# Patient Record
Sex: FEMALE | Race: WHITE | Employment: UNEMPLOYED | URBAN - METROPOLITAN AREA
[De-identification: names, ages, dates, MRNs, and addresses within clinical notes are randomized per-mention and may not be internally consistent; named-entity substitution may affect disease eponyms.]

---

## 2017-04-05 ENCOUNTER — HOSPITAL ENCOUNTER (OUTPATIENT)
Age: 52
Discharge: HOME | End: 2017-04-05
Attending: FAMILY MEDICINE
Payer: COMMERCIAL

## 2017-04-05 VITALS
BODY MASS INDEX: 32.14 KG/M2 | DIASTOLIC BLOOD PRESSURE: 89 MMHG | HEIGHT: 66 IN | WEIGHT: 200 LBS | OXYGEN SATURATION: 100 % | SYSTOLIC BLOOD PRESSURE: 144 MMHG | HEART RATE: 71 BPM | TEMPERATURE: 97 F | RESPIRATION RATE: 12 BRPM

## 2017-04-05 DIAGNOSIS — R35.0 URINARY FREQUENCY: Primary | ICD-10-CM

## 2017-04-05 PROCEDURE — 99214 OFFICE O/P EST MOD 30 MIN: CPT

## 2017-04-05 PROCEDURE — 87086 URINE CULTURE/COLONY COUNT: CPT | Performed by: FAMILY MEDICINE

## 2017-04-05 PROCEDURE — 99213 OFFICE O/P EST LOW 20 MIN: CPT

## 2017-04-05 RX ORDER — NITROFURANTOIN 25; 75 MG/1; MG/1
100 CAPSULE ORAL 2 TIMES DAILY
Qty: 14 CAPSULE | Refills: 0 | Status: SHIPPED | OUTPATIENT
Start: 2017-04-05 | End: 2017-04-12

## 2017-04-05 NOTE — ED PROVIDER NOTES
[facility]    History   Patient presents with:  Urinary Symptoms (urologic)    Stated Complaint: Poss UTI    HPI    50year old Female patient presents with with  urgency and frequency for 1 day.   Denies va Resp 12  Ht 167.6 cm (5' 6\")  Wt 90.719 kg  BMI 32.30 kg/m2  SpO2 100%  LMP 03/22/2017 (Approximate)    GENERAL: well developed, well nourished, well hydrated, appears comfortable  HEENT:  PERRLA, EOMI, MMM  NECK: supple, no adenopathy  LUNGS:  b/l cta wi

## 2017-04-11 ENCOUNTER — TELEPHONE (OUTPATIENT)
Dept: INTERNAL MEDICINE CLINIC | Facility: CLINIC | Age: 52
End: 2017-04-11

## 2017-04-11 NOTE — TELEPHONE ENCOUNTER
Please contact the patient. She can go ahead and start probiotics. It may help. If her urinary symptoms have resolved, I would not go back on the Macrobid at this time. If she is still having any residual symptoms, she should resume the Macrobid.

## 2017-04-11 NOTE — TELEPHONE ENCOUNTER
Actions Requested: should medication be resumed and probiotics started  Situation/Background   Problem: nausea and gas   Onset: 4/9/17   Associated Symptoms: taken AZO before IC and Dx with UTI treated macrobid.   4 days after started nausea with 1 episode

## 2017-04-11 NOTE — TELEPHONE ENCOUNTER
Pt calling states she was in IC for UTI sx, pt states she was given antibiotics that have caused a reactions. Pt c/o abdominal cramping, vomiting, and vomiting. Pt states she has stopped taking med and would like advice on next step.        Current Outpatie

## 2017-05-27 RX ORDER — LEVOTHYROXINE SODIUM 0.05 MG/1
TABLET ORAL
Qty: 30 TABLET | Refills: 1 | Status: SHIPPED | OUTPATIENT
Start: 2017-05-27 | End: 2017-07-17

## 2017-05-27 NOTE — TELEPHONE ENCOUNTER
Pt is currently in [address] called to check status   Pharm stts its okay to send to [facility] on file and they could transfer her RX   Please advise, Pt out of meds

## 2017-05-27 NOTE — TELEPHONE ENCOUNTER
Hypothyroid Medications  Protocol Criteria:  Appointment scheduled in the past 12 months or the next 3 months  TSH resulted in the past 12 months that is normal  Recent Visits       Provider Department Primary Dx    1 year ago MD Cathy Ortiz

## 2017-07-17 ENCOUNTER — OFFICE VISIT (OUTPATIENT)
Dept: INTERNAL MEDICINE CLINIC | Facility: CLINIC | Age: 52
End: 2017-07-17

## 2017-07-17 VITALS
SYSTOLIC BLOOD PRESSURE: 141 MMHG | DIASTOLIC BLOOD PRESSURE: 83 MMHG | HEART RATE: 75 BPM | WEIGHT: 249 LBS | HEIGHT: 66 IN | BODY MASS INDEX: 40.02 KG/M2

## 2017-07-17 DIAGNOSIS — D23.4 BENIGN NEOPLASM OF SCALP: ICD-10-CM

## 2017-07-17 DIAGNOSIS — E04.9 GOITER: Primary | ICD-10-CM

## 2017-07-17 DIAGNOSIS — E78.5 HYPERLIPIDEMIA, UNSPECIFIED HYPERLIPIDEMIA TYPE: ICD-10-CM

## 2017-07-17 PROCEDURE — 99212 OFFICE O/P EST SF 10 MIN: CPT | Performed by: INTERNAL MEDICINE

## 2017-07-17 PROCEDURE — 99214 OFFICE O/P EST MOD 30 MIN: CPT | Performed by: INTERNAL MEDICINE

## 2017-07-17 RX ORDER — LEVOTHYROXINE SODIUM 0.1 MG/1
100 TABLET ORAL
Qty: 30 TABLET | Refills: 5 | Status: SHIPPED | OUTPATIENT
Start: 2017-07-17 | End: 2018-01-24

## 2017-07-17 NOTE — PROGRESS NOTES
HPI:    Patient ID: Gonzalez Hutchinson is a 50year old female. HPI  Patient is here for follow-up on medical issues as listed below. Last seen here in April 2016. No changes made at that time.   Ultrasound done at that point showed persistent multinodular Neurological: Negative for weakness, numbness and headaches. Hematological: Does not bruise/bleed easily. Psychiatric/Behavioral: Negative for depressed mood. The patient is not nervous/anxious.              Current Outpatient Prescriptions:  [place] [facility] hyperlipidemia type  Plan: Work on diet, exercise, weight loss. Recheck later in the year. (D23.4) Benign neoplasm of scalp  Plan: Appears benign.   Consider dermatology evaluation if patient desires or for changes.    (Z68.41) BMI 40.0-44.9, adult ([address]

## 2017-07-25 RX ORDER — LEVOTHYROXINE SODIUM 0.05 MG/1
TABLET ORAL
Qty: 30 TABLET | Refills: 0 | OUTPATIENT
Start: 2017-07-25

## 2017-10-03 ENCOUNTER — HOSPITAL ENCOUNTER (OUTPATIENT)
Age: 52
Discharge: HOME | End: 2017-10-03
Payer: COMMERCIAL

## 2017-10-03 VITALS
SYSTOLIC BLOOD PRESSURE: 150 MMHG | BODY MASS INDEX: 32.14 KG/M2 | OXYGEN SATURATION: 100 % | DIASTOLIC BLOOD PRESSURE: 84 MMHG | TEMPERATURE: 98 F | HEIGHT: 66 IN | WEIGHT: 200 LBS | HEART RATE: 81 BPM | RESPIRATION RATE: 18 BRPM
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
## 2017-10-03 PROCEDURE — 99213 OFFICE O/P EST LOW 20 MIN: CPT

## 2017-10-03 PROCEDURE — 99214 OFFICE O/P EST MOD 30 MIN: CPT

## 2017-10-03 RX ORDER — PREDNISONE 20 MG/1
40 TABLET ORAL DAILY
Qty: 10 TABLET | Refills: 0 | Status: SHIPPED | OUTPATIENT
Start: 2017-10-03 | End: 2017-10-08

## 2017-10-03 RX ORDER — CEPHALEXIN 500 MG/1
500 CAPSULE ORAL 4 TIMES DAILY
Qty: 28 CAPSULE | Refills: 0 | Status: SHIPPED | OUTPATIENT
Start: 2017-10-03 | End: 2017-10-10

## 2017-10-03 NOTE — ED PROVIDER NOTES
Patient presents with:  Bite (integumentary)      HPI:     Norbert Silva is a 50year old female who presents today with a chief complaint of an insect bite to the left medial lower extremity.   The patient states she has a history of infected insect bites extremity. Size of apple. Redness, swelling, warm to touch. No pus drainage. No streaking.  Non-circumferential.   HEENT: atraumatic, normocephalic, ears, nose and throat are clear  EYES: sclera non icteric bilateral  NECK: supple, no adenopathy  LUNGS: james

## 2017-10-03 NOTE — ED INITIAL ASSESSMENT (HPI)
PATIENT ARRIVED AMBULATORY TO ROOM C/O A POSSIBLE INSECT BITE TO THE LEFT LOWER EXTREMITY. +REDNESS. +SWELLING. +ITCHINESS. LITTLE RELIEF WITH HYDROCORTISONE CREAM. NO FEVERS. NO DRAINAGE.

## 2018-01-24 RX ORDER — LEVOTHYROXINE SODIUM 0.1 MG/1
100 TABLET ORAL
Qty: 90 TABLET | Refills: 0 | Status: SHIPPED | OUTPATIENT
Start: 2018-01-24 | End: 2018-03-29

## 2018-01-24 NOTE — TELEPHONE ENCOUNTER
Please advise on refill request.    Hypothyroid Medications  Protocol Criteria:  Appointment scheduled in the past 12 months or the next 3 months  TSH resulted in the past 12 months that is normal  Recent Outpatient Visits            6 months ago Goiter

## 2018-01-25 NOTE — TELEPHONE ENCOUNTER
Pt was called and notified regarding GILMAK's message below, w/understanding. I offered to schedule f/u appt with pt over the phone however she declined at this time stating she needs to check her scheduled first and will call back in a few days to do so.

## 2018-01-25 NOTE — TELEPHONE ENCOUNTER
Refill x 1 only. Call pt. Needs appt soon as well as repeat TSH to make sure the dose is correct. Please order lab.  Diagnosis hypothyroidism

## 2018-03-23 ENCOUNTER — TELEPHONE (OUTPATIENT)
Dept: INTERNAL MEDICINE CLINIC | Facility: CLINIC | Age: 53
End: 2018-03-23

## 2018-03-23 DIAGNOSIS — E03.9 HYPOTHYROIDISM, UNSPECIFIED TYPE: Primary | ICD-10-CM

## 2018-03-23 NOTE — TELEPHONE ENCOUNTER
Pt is at the lab right now requesting labs for thyroid b/c she was advised to get them done before her appt on 3/29. There are no orders in epic. pls advise.  Thank you

## 2018-03-27 ENCOUNTER — APPOINTMENT (OUTPATIENT)
Dept: LAB | Age: 53
End: 2018-03-27
Attending: INTERNAL MEDICINE
Payer: COMMERCIAL

## 2018-03-27 DIAGNOSIS — E03.9 HYPOTHYROIDISM, UNSPECIFIED TYPE: ICD-10-CM

## 2018-03-27 LAB — TSH SERPL-ACNC: 3.33 UIU/ML (ref 0.45–5.33)

## 2018-03-27 PROCEDURE — 36415 COLL VENOUS BLD VENIPUNCTURE: CPT

## 2018-03-27 PROCEDURE — 84443 ASSAY THYROID STIM HORMONE: CPT

## 2018-03-29 ENCOUNTER — OFFICE VISIT (OUTPATIENT)
Dept: INTERNAL MEDICINE CLINIC | Facility: CLINIC | Age: 53
End: 2018-03-29

## 2018-03-29 VITALS
TEMPERATURE: 98 F | WEIGHT: 247.38 LBS | SYSTOLIC BLOOD PRESSURE: 128 MMHG | RESPIRATION RATE: 20 BRPM | HEIGHT: 66 IN | HEART RATE: 68 BPM | BODY MASS INDEX: 39.76 KG/M2 | DIASTOLIC BLOOD PRESSURE: 84 MMHG

## 2018-03-29 DIAGNOSIS — E04.9 GOITER: Primary | ICD-10-CM

## 2018-03-29 PROCEDURE — 99212 OFFICE O/P EST SF 10 MIN: CPT | Performed by: INTERNAL MEDICINE

## 2018-03-29 PROCEDURE — 99214 OFFICE O/P EST MOD 30 MIN: CPT | Performed by: INTERNAL MEDICINE

## 2018-03-29 RX ORDER — LEVOTHYROXINE SODIUM 0.12 MG/1
125 TABLET ORAL
Qty: 30 TABLET | Refills: 5 | Status: SHIPPED | OUTPATIENT
Start: 2018-03-29 | End: 2018-08-13

## 2018-03-29 NOTE — PROGRESS NOTES
HPI:    Patient ID: Lorne Nogueira is a 48year old female. HPI  Patient is here for follow-up on multinodular goiter. Last seen here in July. We increased the dose from 50 up to 100 mcg a day of levothyroxine.   She feels that this helped to shrink melanie DAILY. Disp: 90 tablet Rfl: 0   Fluticasone Propionate (FLONASE) 50 MCG/ACT Nasal Suspension  Disp:  Rfl: 0   B Complex Vitamins (B COMPLEX 50) Oral Tab Take 1 tablet by mouth daily.  Disp:  Rfl:    Cholecalciferol (VITAMIN D-3) 5000 UNITS Oral Tab Take 1 t

## 2018-05-10 ENCOUNTER — NURSE TRIAGE (OUTPATIENT)
Dept: OTHER | Age: 53
End: 2018-05-10

## 2018-05-11 ENCOUNTER — OFFICE VISIT (OUTPATIENT)
Dept: INTERNAL MEDICINE CLINIC | Facility: CLINIC | Age: 53
End: 2018-05-11

## 2018-05-11 VITALS
BODY MASS INDEX: 38.57 KG/M2 | WEIGHT: 240 LBS | HEIGHT: 66 IN | TEMPERATURE: 98 F | HEART RATE: 70 BPM | DIASTOLIC BLOOD PRESSURE: 84 MMHG | SYSTOLIC BLOOD PRESSURE: 122 MMHG

## 2018-05-11 DIAGNOSIS — J01.00 ACUTE NON-RECURRENT MAXILLARY SINUSITIS: Primary | ICD-10-CM

## 2018-05-11 PROCEDURE — 99213 OFFICE O/P EST LOW 20 MIN: CPT | Performed by: INTERNAL MEDICINE

## 2018-05-11 PROCEDURE — 99212 OFFICE O/P EST SF 10 MIN: CPT | Performed by: INTERNAL MEDICINE

## 2018-05-11 RX ORDER — AMOXICILLIN AND CLAVULANATE POTASSIUM 875; 125 MG/1; MG/1
1 TABLET, FILM COATED ORAL 2 TIMES DAILY
Qty: 14 TABLET | Refills: 0 | Status: SHIPPED | OUTPATIENT
Start: 2018-05-11 | End: 2018-05-21

## 2018-05-11 NOTE — PATIENT INSTRUCTIONS
Acute Sinusitis    Acute sinusitis is irritation and swelling of the sinuses. It is usually caused by a viral infection after a common cold. Your doctor can help you find relief. What is acute sinusitis?   Sinuses are air-filled spaces in the skull behin © 4548-3816 [address]. [address] [facility] [place], [facility] [place]. All rights reserved. This information is not intended as a substitute for professional medical care. Always follow your healthcare professional's instructions.

## 2018-05-11 NOTE — PROGRESS NOTES
Quintin Raines is a 48year old female.   Patient presents with:  Sinusitis: started 2 days ago, itchy eyes, congestion, sinus pressure, headache      HPI:   Pt comes as an urgent visit   c/c sinusitis   c/o charted as an allergy and allergy symptoms which i feet  NEURO: denies headaches , anxiety, depression    EXAM:   /84 (BP Location: Right arm, Patient Position: Sitting, Cuff Size: large)   Pulse 70   Temp (!) 97.5 °F (36.4 °C) (Oral)   Ht 5' 6\" (1.676 m)   Wt 240 lb (108.9 kg)   BMI 38.74 kg/m²   G

## 2018-05-11 NOTE — TELEPHONE ENCOUNTER
Action Requested: Summary for Provider     []  Critical Lab, Recommendations Needed  [x] Need Additional Advice  []   FYI    []   Need Orders  [] Need Medications Sent to Pharmacy  []  Other     SUMMARY: Pt requesting to be seen Tomorrow with You or Rushin

## 2018-05-14 ENCOUNTER — OFFICE VISIT (OUTPATIENT)
Dept: INTERNAL MEDICINE CLINIC | Facility: CLINIC | Age: 53
End: 2018-05-14

## 2018-05-14 VITALS
HEIGHT: 66 IN | WEIGHT: 240 LBS | TEMPERATURE: 98 F | HEART RATE: 87 BPM | DIASTOLIC BLOOD PRESSURE: 88 MMHG | SYSTOLIC BLOOD PRESSURE: 127 MMHG | BODY MASS INDEX: 38.57 KG/M2

## 2018-05-14 DIAGNOSIS — J01.01 ACUTE RECURRENT MAXILLARY SINUSITIS: Primary | ICD-10-CM

## 2018-05-14 PROCEDURE — 99212 OFFICE O/P EST SF 10 MIN: CPT | Performed by: INTERNAL MEDICINE

## 2018-05-14 PROCEDURE — 99213 OFFICE O/P EST LOW 20 MIN: CPT | Performed by: INTERNAL MEDICINE

## 2018-05-14 RX ORDER — PREDNISONE 1 MG/1
5 TABLET ORAL DAILY
Qty: 5 TABLET | Refills: 0 | Status: SHIPPED | OUTPATIENT
Start: 2018-05-14 | End: 2018-07-06

## 2018-05-14 NOTE — PROGRESS NOTES
Sofía Hamilton is a 48year old female.   Patient presents with:  Sinusitis: follow up from 5/11 visit still not better       HPI:   Pt comes for f/u   c/c sinus congestion   c/o not feeling better   She states she is feeling the same as she did on Friday an Pulse 87   Temp 97.9 °F (36.6 °C) (Oral)   Ht 5' 6\" (1.676 m)   Wt 240 lb (108.9 kg)   BMI 38.74 kg/m²   GENERAL: well developed, well nourished,in no apparent distress  SKIN: no rashes,no suspicious lesions  HEENT: atraumatic, normocephalic,ears- mild re

## 2018-05-14 NOTE — PATIENT INSTRUCTIONS
Sinusitis (No Antibiotics)    The sinuses are air-filled spaces within the bones of the face. They connect to the inside of the nose. Sinusitis is an inflammation of the tissue lining the sinus cavity.  Sinus inflammation can occur during a cold. It can a · Use acetaminophen or ibuprofen to control pain, unless another pain medicine was prescribed. (If you have chronic liver or kidney disease or ever had a stomach ulcer, talk with your doctor before using these medicines.  Aspirin should never be used in any · Drink plenty of water, hot tea, and other liquids. This may help thin mucus. It also may promote sinus drainage. · Heat may help soothe painful areas of the face. Use a towel soaked in hot water.  Or,  the shower and direct the hot spray onto you · Unusual drowsiness or confusion  · Swelling of the forehead or eyelids  · Vision problems, including blurred or double vision  · Fever of 100.4ºF (38ºC) or higher, or as directed by your healthcare provider  · Seizure  · Breathing problems  · Symptoms no

## 2018-07-06 ENCOUNTER — APPOINTMENT (OUTPATIENT)
Dept: LAB | Facility: HOSPITAL | Age: 53
End: 2018-07-06
Attending: INTERNAL MEDICINE
Payer: COMMERCIAL

## 2018-07-06 ENCOUNTER — OFFICE VISIT (OUTPATIENT)
Dept: OBGYN CLINIC | Facility: CLINIC | Age: 53
End: 2018-07-06

## 2018-07-06 VITALS
HEIGHT: 66 IN | HEART RATE: 74 BPM | WEIGHT: 240.81 LBS | DIASTOLIC BLOOD PRESSURE: 80 MMHG | BODY MASS INDEX: 38.7 KG/M2 | SYSTOLIC BLOOD PRESSURE: 115 MMHG

## 2018-07-06 DIAGNOSIS — D25.1 INTRAMURAL LEIOMYOMA OF UTERUS: ICD-10-CM

## 2018-07-06 DIAGNOSIS — N92.6 IRREGULAR MENSES: ICD-10-CM

## 2018-07-06 DIAGNOSIS — Z01.419 ENCOUNTER FOR GYNECOLOGICAL EXAMINATION WITHOUT ABNORMAL FINDING: Primary | ICD-10-CM

## 2018-07-06 DIAGNOSIS — Z12.31 VISIT FOR SCREENING MAMMOGRAM: ICD-10-CM

## 2018-07-06 DIAGNOSIS — Z12.4 SCREENING FOR MALIGNANT NEOPLASM OF CERVIX: ICD-10-CM

## 2018-07-06 DIAGNOSIS — E04.9 GOITER: ICD-10-CM

## 2018-07-06 LAB — TSH SERPL-ACNC: 1.97 UIU/ML (ref 0.45–5.33)

## 2018-07-06 PROCEDURE — 99213 OFFICE O/P EST LOW 20 MIN: CPT | Performed by: OBSTETRICS & GYNECOLOGY

## 2018-07-06 PROCEDURE — 99386 PREV VISIT NEW AGE 40-64: CPT | Performed by: OBSTETRICS & GYNECOLOGY

## 2018-07-06 PROCEDURE — 84443 ASSAY THYROID STIM HORMONE: CPT

## 2018-07-06 PROCEDURE — 36415 COLL VENOUS BLD VENIPUNCTURE: CPT

## 2018-07-06 NOTE — PROGRESS NOTES
Mena Luciano is a 48year old female  Patient's last menstrual period was 2018. who presents for Patient presents with:  Gyn Exam: New Patient, Annual. Mammo order  Pt is new to me and moved from [facility] 3 years ago.   She states she had a 4-5 c Tab, Take 1 tablet (125 mcg total) by mouth once daily. , Disp: 30 tablet, Rfl: 5  •  Fluticasone Propionate (FLONASE) 50 MCG/ACT Nasal Suspension, , Disp: , Rfl: 0  •  B Complex Vitamins (B COMPLEX 50) Oral Tab, Take 1 tablet by mouth daily. , Disp: , Rfl: size, location, without lesions and prolapse  Bladder:  No fullness, masses or tenderness  Vagina:  Normal appearance without lesions, no abnormal discharge  Cervix:  Normal without tenderness on motion  Uterus: normal in size, contour, position, mobility,

## 2018-07-08 LAB — HPV I/H RISK 1 DNA SPEC QL NAA+PROBE: NEGATIVE

## 2018-08-13 RX ORDER — LEVOTHYROXINE SODIUM 0.12 MG/1
125 TABLET ORAL
Qty: 90 TABLET | Refills: 0 | Status: SHIPPED | OUTPATIENT
Start: 2018-08-13 | End: 2018-12-23

## 2018-08-13 NOTE — TELEPHONE ENCOUNTER
(on HIPAA) called, he will be changing jobs, will not have insurance for 90-days, asked for a script for patient to cover the 90 days when no insurance so patient can get thyroid medicine at pharmacy, paying cash for it.  He wants script that pharma

## 2018-12-24 RX ORDER — LEVOTHYROXINE SODIUM 0.12 MG/1
125 TABLET ORAL
Qty: 90 TABLET | Refills: 0 | Status: SHIPPED | OUTPATIENT
Start: 2018-12-24 | End: 2019-03-08

## 2018-12-24 NOTE — TELEPHONE ENCOUNTER
Refill passed per [address] [place] protocol.   Hypothyroid Medications  Protocol Criteria:  Appointment scheduled in the past 12 months or the next 3 months  TSH resulted in the past 12 months that is normal  Recent Outpatient Visits            5 months ago Encounter for gynecological examination without abnormal finding    [facility] Shaune Prader, MD    Office Visit    7 months ago Acute recurrent maxillary sinusitis    Benigno Anguiano MD    Office Visit    7 months ago Acute non-recurrent maxillary sinusitis    Crissy Pierce MD    Office Visit    9 months ago [address] Rod Neighbors, MD    Office Visit    1 year ago Lyle Mata MD    Office Visit          Lab Results   Component Value Date    TSH 1.97 07/06/2018

## 2019-03-10 NOTE — TELEPHONE ENCOUNTER
Hypothyroid Medications  Protocol Criteria:  Appointment scheduled in the past 12 months or the next 3 months  TSH resulted in the past 12 months that is normal  Recent Outpatient Visits            8 months ago Encounter for gynecological examination witho

## 2019-03-11 RX ORDER — LEVOTHYROXINE SODIUM 0.12 MG/1
125 TABLET ORAL
Qty: 90 TABLET | Refills: 0 | Status: SHIPPED | OUTPATIENT
Start: 2019-03-11 | End: 2019-07-05

## 2019-03-15 ENCOUNTER — OFFICE VISIT (OUTPATIENT)
Dept: INTERNAL MEDICINE CLINIC | Facility: CLINIC | Age: 54
End: 2019-03-15
Payer: COMMERCIAL

## 2019-03-15 VITALS
HEIGHT: 66 IN | BODY MASS INDEX: 39.21 KG/M2 | WEIGHT: 244 LBS | HEART RATE: 76 BPM | SYSTOLIC BLOOD PRESSURE: 130 MMHG | DIASTOLIC BLOOD PRESSURE: 90 MMHG

## 2019-03-15 DIAGNOSIS — Z12.11 COLON CANCER SCREENING: ICD-10-CM

## 2019-03-15 DIAGNOSIS — E03.9 HYPOTHYROIDISM, UNSPECIFIED TYPE: ICD-10-CM

## 2019-03-15 DIAGNOSIS — Z12.31 VISIT FOR SCREENING MAMMOGRAM: ICD-10-CM

## 2019-03-15 DIAGNOSIS — E04.9 GOITER: ICD-10-CM

## 2019-03-15 DIAGNOSIS — Z00.00 PHYSICAL EXAM: Primary | ICD-10-CM

## 2019-03-15 PROCEDURE — 99396 PREV VISIT EST AGE 40-64: CPT | Performed by: INTERNAL MEDICINE

## 2019-03-15 NOTE — PATIENT INSTRUCTIONS
Prevention Guidelines, Women Ages 48 to 59  Screening tests and vaccines are an important part of managing your health. A screening test is done to find possible disorders or diseases in people who don't have any symptoms.  The goal is to find a disease e Chlamydia Women at increased risk for infection At routine exams   Colorectal cancer All women in this age group Flexible sigmoidoscopy every 5 years, or colonoscopy every 10 years, or double-contrast barium enema every 5 years; yearly fecal occult blood t Hepatitis B Women at increased risk for infection – talk with your healthcare provider 3 doses over 6 months; second dose should be given 1 month after the first dose; the third dose should be given at least 2 months after the second dose and at least 4 mo Use of daily aspirin Women ages 54 and up in this age group who are at risk for cardiovascular health problems such as stroke When your risk is known   Use of tobacco and the health effects it can cause All women in this age group Every exam   1Amerdeja Ca

## 2019-03-15 NOTE — PROGRESS NOTES
Quintin Raines is a 48year old female.   Patient presents with:  Physical      HPI:   Pt comes for a physical  C/c physical   C/o  Concerned about her goiter   \" I have a gyn \"   In [place] --had an ovarian cyst          Problem list   Hypothyroid   Goiter rashes,no suspicious lesions  HEENT: atraumatic, normocephalic,ears and throat are clear, no frontal or maxillary sinus tenderness , both equal and reactive to light bilaterally, extraocular muscles intact  NECK: supple,no adenopathy, non tender , +goiter

## 2019-04-22 ENCOUNTER — OFFICE VISIT (OUTPATIENT)
Dept: INTERNAL MEDICINE CLINIC | Facility: CLINIC | Age: 54
End: 2019-04-22
Payer: COMMERCIAL

## 2019-04-22 VITALS
HEART RATE: 84 BPM | SYSTOLIC BLOOD PRESSURE: 128 MMHG | HEIGHT: 65 IN | TEMPERATURE: 98 F | OXYGEN SATURATION: 98 % | BODY MASS INDEX: 41.45 KG/M2 | WEIGHT: 248.81 LBS | DIASTOLIC BLOOD PRESSURE: 82 MMHG

## 2019-04-22 DIAGNOSIS — R21 RASH: Primary | ICD-10-CM

## 2019-04-22 PROCEDURE — 99213 OFFICE O/P EST LOW 20 MIN: CPT | Performed by: PHYSICIAN ASSISTANT

## 2019-04-22 PROCEDURE — 99212 OFFICE O/P EST SF 10 MIN: CPT | Performed by: PHYSICIAN ASSISTANT

## 2019-04-22 NOTE — PROGRESS NOTES
HPI:    Patient ID: Sabrina Saba is a 48year old female. HPI   Patient presents with what looks to be bug bites that she got 4 days ago.   She notes that she has not gotten any more bites however she has more erythematous patches wrapping around her ri • Diabetes Mother    • Stroke Mother          PHYSICAL EXAM:   /82 (BP Location: Right arm, Cuff Size: large)   Pulse 84   Temp 98.3 °F (36.8 °C) (Oral)   Ht 5' 5\" (1.651 m)   Wt 248 lb 12.8 oz (112.9 kg)   LMP 02/22/2019   SpO2 98%   Breastfeeding? Meds This Visit:  Requested Prescriptions      No prescriptions requested or ordered in this encounter       Imaging & Referrals:  None         [id number]

## 2019-04-26 ENCOUNTER — OFFICE VISIT (OUTPATIENT)
Dept: INTERNAL MEDICINE CLINIC | Facility: CLINIC | Age: 54
End: 2019-04-26
Payer: COMMERCIAL

## 2019-04-26 VITALS
DIASTOLIC BLOOD PRESSURE: 58 MMHG | BODY MASS INDEX: 41.32 KG/M2 | HEIGHT: 65 IN | WEIGHT: 248 LBS | SYSTOLIC BLOOD PRESSURE: 141 MMHG | HEART RATE: 76 BPM

## 2019-04-26 DIAGNOSIS — B02.9 HERPES ZOSTER WITHOUT COMPLICATION: Primary | ICD-10-CM

## 2019-04-26 PROCEDURE — 99213 OFFICE O/P EST LOW 20 MIN: CPT | Performed by: PHYSICIAN ASSISTANT

## 2019-04-26 PROCEDURE — 99212 OFFICE O/P EST SF 10 MIN: CPT | Performed by: PHYSICIAN ASSISTANT

## 2019-04-26 NOTE — PROGRESS NOTES
HPI:    Patient ID: Aishwarya Licea is a 48year old female. HPI   Patient presents for follow-up of a rash that started 8 days ago that is on her left trunk and wraps around the back. Rash does not cross midline.   Patient states that rash has been itchy PHYSICAL EXAM:   /58 (BP Location: Left arm, Patient Position: Sitting, Cuff Size: large)   Pulse 76   Ht 5' 5\" (1.651 m)   Wt 248 lb (112.5 kg)   BMI 41.27 kg/m²   Body mass index is 41.27 kg/m².   Physical Exam    Constitutional: She is oriented to

## 2019-05-03 ENCOUNTER — OFFICE VISIT (OUTPATIENT)
Dept: INTERNAL MEDICINE CLINIC | Facility: CLINIC | Age: 54
End: 2019-05-03
Payer: COMMERCIAL

## 2019-05-03 VITALS
WEIGHT: 248 LBS | SYSTOLIC BLOOD PRESSURE: 131 MMHG | BODY MASS INDEX: 41.32 KG/M2 | HEIGHT: 65 IN | HEART RATE: 79 BPM | TEMPERATURE: 98 F | DIASTOLIC BLOOD PRESSURE: 85 MMHG

## 2019-05-03 DIAGNOSIS — B02.9 HERPES ZOSTER WITHOUT COMPLICATION: Primary | ICD-10-CM

## 2019-05-03 PROCEDURE — 99212 OFFICE O/P EST SF 10 MIN: CPT | Performed by: INTERNAL MEDICINE

## 2019-05-03 PROCEDURE — 99213 OFFICE O/P EST LOW 20 MIN: CPT | Performed by: INTERNAL MEDICINE

## 2019-05-03 NOTE — PROGRESS NOTES
Brittni Dixon is a 48year old female. Patient presents with: Follow - Up: f/u on shingles diagnosis.        HPI:   Pt comes for f/u  C/c rash  C/o shingles diag noted last week -has had chickenpox as a child  Started as back ache -- now better    Came in °F (36.8 °C) (Oral)   Ht 5' 5\" (1.651 m)   Wt 248 lb (112.5 kg)   BMI 41.27 kg/m²   GENERAL: well developed, well nourished,in no apparent distress  SKIN: Crusted over lesions in the dermatomal region on the left side of the flank  HEENT: atraumatic, norm

## 2019-07-05 RX ORDER — LEVOTHYROXINE SODIUM 0.12 MG/1
TABLET ORAL
Qty: 90 TABLET | Refills: 0 | Status: SHIPPED | OUTPATIENT
Start: 2019-07-05 | End: 2019-10-26

## 2019-07-05 NOTE — TELEPHONE ENCOUNTER
Refill passed per [facility] [place] protocol.     Hypothyroid Medications  Protocol Criteria:  Appointment scheduled in the past 12 months or the next 3 months  TSH resulted in the past 12 months that is normal  Recent Outpatient Visits            2 months ag

## 2019-09-11 ENCOUNTER — OFFICE VISIT (OUTPATIENT)
Dept: INTERNAL MEDICINE CLINIC | Facility: CLINIC | Age: 54
End: 2019-09-11
Payer: COMMERCIAL

## 2019-09-11 VITALS
DIASTOLIC BLOOD PRESSURE: 83 MMHG | TEMPERATURE: 98 F | WEIGHT: 250 LBS | SYSTOLIC BLOOD PRESSURE: 131 MMHG | HEIGHT: 65 IN | HEART RATE: 80 BPM | BODY MASS INDEX: 41.65 KG/M2

## 2019-09-11 DIAGNOSIS — J06.9 VIRAL UPPER RESPIRATORY TRACT INFECTION: Primary | ICD-10-CM

## 2019-09-11 DIAGNOSIS — E03.9 HYPOTHYROIDISM, UNSPECIFIED TYPE: ICD-10-CM

## 2019-09-11 PROCEDURE — 99213 OFFICE O/P EST LOW 20 MIN: CPT | Performed by: INTERNAL MEDICINE

## 2019-09-11 PROCEDURE — 99212 OFFICE O/P EST SF 10 MIN: CPT | Performed by: INTERNAL MEDICINE

## 2019-09-11 RX ORDER — BENZONATATE 100 MG/1
100 CAPSULE ORAL 2 TIMES DAILY PRN
Qty: 10 CAPSULE | Refills: 0 | Status: SHIPPED | OUTPATIENT
Start: 2019-09-11 | End: 2020-02-25

## 2019-09-11 NOTE — PROGRESS NOTES
Jackson Jacbosen is a 48year old female.   Patient presents with:  Cough: Pt states she has nasal drainage and coughing for past 5 days       HPI:   Pt comes as an urgent visit with her    C/c cold sympt   C/o was concerned because this morning she was chest pain on exertion, palpitations, swelling in feet  GI: denies abdominal pain and denies heartburn or nausea + vomiting--just that one episode this morning after coughing  MUS: No back pain, joint pain, muscle pain  NEURO: denies headaches , anxiety, d

## 2019-10-28 RX ORDER — LEVOTHYROXINE SODIUM 0.12 MG/1
TABLET ORAL
Qty: 90 TABLET | Refills: 1 | Status: SHIPPED | OUTPATIENT
Start: 2019-10-28 | End: 2020-06-09

## 2019-10-28 NOTE — TELEPHONE ENCOUNTER
Hypothyroid Medications  Protocol Criteria:  Appointment scheduled in the past 12 months or the next 3 months  TSH resulted in the past 12 months that is normal  Recent Outpatient Visits            1 month ago Viral upper respiratory tract infection    [place]

## 2019-11-06 ENCOUNTER — PATIENT MESSAGE (OUTPATIENT)
Dept: INTERNAL MEDICINE CLINIC | Facility: CLINIC | Age: 54
End: 2019-11-06

## 2019-11-06 NOTE — TELEPHONE ENCOUNTER
From: Ellie Shore  To: Bo Montilla MD  Sent: 11/6/2019 8:38 AM CST  Subject: Prescription Question    Due to insurance, I need to change my pharmacy to [address]  at [address], [address] [place] [address]. Contact number is [phone].     My hu

## 2020-02-25 ENCOUNTER — OFFICE VISIT (OUTPATIENT)
Dept: INTERNAL MEDICINE CLINIC | Facility: CLINIC | Age: 55
End: 2020-02-25
Payer: COMMERCIAL

## 2020-02-25 ENCOUNTER — TELEPHONE (OUTPATIENT)
Dept: INTERNAL MEDICINE CLINIC | Facility: CLINIC | Age: 55
End: 2020-02-25

## 2020-02-25 VITALS
BODY MASS INDEX: 40.82 KG/M2 | HEART RATE: 92 BPM | WEIGHT: 245 LBS | HEIGHT: 65 IN | RESPIRATION RATE: 18 BRPM | TEMPERATURE: 98 F | DIASTOLIC BLOOD PRESSURE: 83 MMHG | SYSTOLIC BLOOD PRESSURE: 116 MMHG

## 2020-02-25 DIAGNOSIS — R05.9 COUGH: Primary | ICD-10-CM

## 2020-02-25 DIAGNOSIS — R09.89 CHEST CONGESTION: ICD-10-CM

## 2020-02-25 PROCEDURE — 99212 OFFICE O/P EST SF 10 MIN: CPT | Performed by: INTERNAL MEDICINE

## 2020-02-25 PROCEDURE — 99213 OFFICE O/P EST LOW 20 MIN: CPT | Performed by: INTERNAL MEDICINE

## 2020-02-25 RX ORDER — AZITHROMYCIN 250 MG/1
TABLET, FILM COATED ORAL
Qty: 6 TABLET | Refills: 0 | Status: SHIPPED | OUTPATIENT
Start: 2020-02-25 | End: 2020-10-27 | Stop reason: ALTCHOICE

## 2020-02-25 RX ORDER — BENZONATATE 100 MG/1
100 CAPSULE ORAL 2 TIMES DAILY PRN
Qty: 10 CAPSULE | Refills: 0 | Status: SHIPPED | OUTPATIENT
Start: 2020-02-25 | End: 2020-10-27 | Stop reason: ALTCHOICE

## 2020-02-25 NOTE — PATIENT INSTRUCTIONS
Viral Upper Respiratory Illness with Wheezing (Adult)    You have a viral upper respiratory illness (URI), which is another term for the common cold. When the infection causes a lot of irritation, the air passages can go into spasm.  This causes wheezing · Over-the-counter cold medicines will not shorten the length of time you’re sick, but they may be helpful for the following symptoms: cough, sore throat, and nasal and sinus congestion.  Ask your healthcare provider or pharmacist which over-the-counter med

## 2020-02-25 NOTE — TELEPHONE ENCOUNTER
Attempt made to contact patient; no answer, left message instructing patient to return call to the clinic. App[employer]exush[employer] message sent to patient as well.

## 2020-02-25 NOTE — PROGRESS NOTES
Lucian Pascual is a 50year old female.   Patient presents with:  Cough  Chest Congestion      HPI:   Pt comes as an urgent visit -- here with    C/c cough and congestion x 6 days   C/o took zertec d and ribitussin -- dried her mucous up but wheezing lesions or rashes  RESPIRATORY: denies shortness of breath,+  Cough- yellow , + wheezing  CARDIOVASCULAR: denies chest pain on exertion, palpitations, swelling in feet  NEURO: +  headaches , anxiety, depression    EXAM:   /83 (BP Location: Right arm,

## 2020-02-28 ENCOUNTER — TELEPHONE (OUTPATIENT)
Dept: OTHER | Age: 55
End: 2020-02-28

## 2020-02-28 NOTE — TELEPHONE ENCOUNTER
Patient states her cough and wheezing is worsening as the afternoon; She has been resting and pushing fluids all afternoon. Contacted Dr. Rocio Zarate via phone as she has left the office for the day.     Verbal orders given as follows:    Raven harvey

## 2020-02-28 NOTE — TELEPHONE ENCOUNTER
Patient calling with update LOV 2/25/2020  Very little improvement  with cough and wheezing, both become  worse when she lies down     Was told to call back; has one  more day of matty an is taking the Benzonatate, does not feel the Benzonatate  Is not  He

## 2020-02-28 NOTE — TELEPHONE ENCOUNTER
Further instructions from Dr. Raghavendra Jones:    Please contact patient on Saturday; Ask if the inhaler and nighttime cough syrup helped her symptoms.     If no improvement, page Dr. Raghavendra Jones.

## 2020-02-29 ENCOUNTER — HOSPITAL ENCOUNTER (OUTPATIENT)
Dept: GENERAL RADIOLOGY | Age: 55
Discharge: HOME | End: 2020-02-29
Attending: INTERNAL MEDICINE
Payer: COMMERCIAL

## 2020-02-29 DIAGNOSIS — R05.9 COUGH: ICD-10-CM

## 2020-02-29 DIAGNOSIS — R09.89 CHEST CONGESTION: ICD-10-CM

## 2020-02-29 PROCEDURE — 71046 X-RAY EXAM CHEST 2 VIEWS: CPT | Performed by: INTERNAL MEDICINE

## 2020-02-29 RX ORDER — ALBUTEROL SULFATE 90 UG/1
2 AEROSOL, METERED RESPIRATORY (INHALATION) EVERY 6 HOURS PRN
Qty: 1 INHALER | Refills: 0 | OUTPATIENT
Start: 2020-02-29 | End: 2020-03-16

## 2020-02-29 RX ORDER — CODEINE PHOSPHATE AND GUAIFENESIN 10; 100 MG/5ML; MG/5ML
5 SOLUTION ORAL NIGHTLY
Qty: 30 ML | Refills: 0 | OUTPATIENT
Start: 2020-02-29 | End: 2020-10-27 | Stop reason: ALTCHOICE

## 2020-02-29 NOTE — TELEPHONE ENCOUNTER
Called and spoke to pt -- she will get the cxr doen that was ordered 4 days ago -- will f/u with results   Cont current recs

## 2020-02-29 NOTE — TELEPHONE ENCOUNTER
Dr Aydee William, please advise. (Called Dr Aydee William to alert her to look at this; she agreed.)    Patient just started on medications last night, noticed a \"rattle\" that comes and goes of \"stuff in lungs\" that clears with coughing.  Cough medicine did help w

## 2020-06-09 RX ORDER — LEVOTHYROXINE SODIUM 0.12 MG/1
TABLET ORAL
Qty: 90 TABLET | Refills: 1 | Status: SHIPPED | OUTPATIENT
Start: 2020-06-09 | End: 2020-12-17

## 2020-06-30 ENCOUNTER — PATIENT MESSAGE (OUTPATIENT)
Dept: INTERNAL MEDICINE CLINIC | Facility: CLINIC | Age: 55
End: 2020-06-30

## 2020-06-30 DIAGNOSIS — Z00.00 ANNUAL PHYSICAL EXAM: Primary | ICD-10-CM

## 2020-06-30 NOTE — TELEPHONE ENCOUNTER
Orders generated, was  . AMS VariCode message sent. From: Quintin Raines  To: Eladio Chavez MD  Sent: 2020 11:45 AM CDT  Subject: Non-Urgent Medical Question    At my last visit, Dr. Ignacio Roberson suggested an ultrasound for my thyroid.  I am also beh

## 2020-07-03 ENCOUNTER — PATIENT MESSAGE (OUTPATIENT)
Dept: INTERNAL MEDICINE CLINIC | Facility: CLINIC | Age: 55
End: 2020-07-03

## 2020-07-03 DIAGNOSIS — E04.9 GOITER: Primary | ICD-10-CM

## 2020-07-03 NOTE — TELEPHONE ENCOUNTER
From: Radha Ontiveros  To:  Theresa Winston MD  Sent: 7/3/2020 1:09 PM CDT  Subject: Non-Urgent Medical Question    Fco Henry,  I have made the appointments for blood test and mammogram. I asked the  about the thyroid ultrasound and she said the

## 2020-07-09 ENCOUNTER — APPOINTMENT (OUTPATIENT)
Dept: LAB | Age: 55
End: 2020-07-09
Attending: INTERNAL MEDICINE
Payer: COMMERCIAL

## 2020-07-09 ENCOUNTER — HOSPITAL ENCOUNTER (OUTPATIENT)
Dept: MAMMOGRAPHY | Age: 55
Discharge: HOME | End: 2020-07-09
Attending: INTERNAL MEDICINE
Payer: COMMERCIAL

## 2020-07-09 DIAGNOSIS — Z00.00 ANNUAL PHYSICAL EXAM: ICD-10-CM

## 2020-07-09 LAB
ALBUMIN SERPL-MCNC: 3.5 G/DL (ref 3.4–5)
ALBUMIN/GLOB SERPL: 0.9 {RATIO} (ref 1–2)
ALP LIVER SERPL-CCNC: 72 U/L (ref 41–108)
ALT SERPL-CCNC: 18 U/L (ref 13–56)
ANION GAP SERPL CALC-SCNC: 5 MMOL/L (ref 0–18)
AST SERPL-CCNC: 20 U/L (ref 15–37)
BASOPHILS # BLD AUTO: 0.04 X10(3) UL (ref 0–0.2)
BASOPHILS NFR BLD AUTO: 0.5 %
BILIRUB SERPL-MCNC: 0.4 MG/DL (ref 0.1–2)
BUN BLD-MCNC: 13 MG/DL (ref 7–18)
BUN/CREAT SERPL: 20 (ref 10–20)
CALCIUM BLD-MCNC: 9.1 MG/DL (ref 8.5–10.1)
CHLORIDE SERPL-SCNC: 104 MMOL/L (ref 98–112)
CHOLEST SMN-MCNC: 208 MG/DL (ref ?–200)
CO2 SERPL-SCNC: 28 MMOL/L (ref 21–32)
CREAT BLD-MCNC: 0.65 MG/DL (ref 0.55–1.02)
DEPRECATED RDW RBC AUTO: 43.3 FL (ref 35.1–46.3)
EOSINOPHIL # BLD AUTO: 0.28 X10(3) UL (ref 0–0.7)
EOSINOPHIL NFR BLD AUTO: 3.3 %
ERYTHROCYTE [DISTWIDTH] IN BLOOD BY AUTOMATED COUNT: 13.2 % (ref 11–15)
GLOBULIN PLAS-MCNC: 3.7 G/DL (ref 2.8–4.4)
GLUCOSE BLD-MCNC: 81 MG/DL (ref 70–99)
HCT VFR BLD AUTO: 38.9 % (ref 35–48)
HDLC SERPL-MCNC: 62 MG/DL (ref 40–59)
HGB BLD-MCNC: 12.6 G/DL (ref 12–16)
IMM GRANULOCYTES # BLD AUTO: 0.02 X10(3) UL (ref 0–1)
IMM GRANULOCYTES NFR BLD: 0.2 %
LDLC SERPL CALC-MCNC: 103 MG/DL (ref ?–100)
LYMPHOCYTES # BLD AUTO: 1.83 X10(3) UL (ref 1–4)
LYMPHOCYTES NFR BLD AUTO: 21.7 %
M PROTEIN MFR SERPL ELPH: 7.2 G/DL (ref 6.4–8.2)
MCH RBC QN AUTO: 29.4 PG (ref 26–34)
MCHC RBC AUTO-ENTMCNC: 32.4 G/DL (ref 31–37)
MCV RBC AUTO: 90.7 FL (ref 80–100)
MONOCYTES # BLD AUTO: 0.61 X10(3) UL (ref 0.1–1)
MONOCYTES NFR BLD AUTO: 7.2 %
NEUTROPHILS # BLD AUTO: 5.65 X10 (3) UL (ref 1.5–7.7)
NEUTROPHILS # BLD AUTO: 5.65 X10(3) UL (ref 1.5–7.7)
NEUTROPHILS NFR BLD AUTO: 67.1 %
NONHDLC SERPL-MCNC: 146 MG/DL (ref ?–130)
OSMOLALITY SERPL CALC.SUM OF ELEC: 283 MOSM/KG (ref 275–295)
PATIENT FASTING Y/N/NP: YES
PATIENT FASTING Y/N/NP: YES
PLATELET # BLD AUTO: 216 10(3)UL (ref 150–450)
POTASSIUM SERPL-SCNC: 3.9 MMOL/L (ref 3.5–5.1)
RBC # BLD AUTO: 4.29 X10(6)UL (ref 3.8–5.3)
SODIUM SERPL-SCNC: 137 MMOL/L (ref 136–145)
TRIGL SERPL-MCNC: 214 MG/DL (ref 30–149)
TSI SER-ACNC: 2.44 MIU/ML (ref 0.36–3.74)
VLDLC SERPL CALC-MCNC: 43 MG/DL (ref 0–30)
WBC # BLD AUTO: 8.4 X10(3) UL (ref 4–11)

## 2020-07-09 PROCEDURE — 80061 LIPID PANEL: CPT | Performed by: INTERNAL MEDICINE

## 2020-07-09 PROCEDURE — 77063 BREAST TOMOSYNTHESIS BI: CPT | Performed by: INTERNAL MEDICINE

## 2020-07-09 PROCEDURE — 80053 COMPREHEN METABOLIC PANEL: CPT | Performed by: INTERNAL MEDICINE

## 2020-07-09 PROCEDURE — 85025 COMPLETE CBC W/AUTO DIFF WBC: CPT | Performed by: INTERNAL MEDICINE

## 2020-07-09 PROCEDURE — 36415 COLL VENOUS BLD VENIPUNCTURE: CPT | Performed by: INTERNAL MEDICINE

## 2020-07-09 PROCEDURE — 77067 SCR MAMMO BI INCL CAD: CPT | Performed by: INTERNAL MEDICINE

## 2020-07-09 PROCEDURE — 84443 ASSAY THYROID STIM HORMONE: CPT | Performed by: INTERNAL MEDICINE

## 2020-07-14 ENCOUNTER — HOSPITAL ENCOUNTER (OUTPATIENT)
Dept: ULTRASOUND IMAGING | Age: 55
Discharge: HOME | End: 2020-07-14
Attending: PHYSICIAN ASSISTANT
Payer: COMMERCIAL

## 2020-07-14 DIAGNOSIS — E04.9 GOITER: ICD-10-CM

## 2020-07-14 PROCEDURE — 76536 US EXAM OF HEAD AND NECK: CPT | Performed by: PHYSICIAN ASSISTANT

## 2020-07-15 DIAGNOSIS — E04.2 MULTIPLE THYROID NODULES: Primary | ICD-10-CM

## 2020-09-11 ENCOUNTER — OFFICE VISIT (OUTPATIENT)
Dept: ENDOCRINOLOGY CLINIC | Facility: CLINIC | Age: 55
End: 2020-09-11
Payer: COMMERCIAL

## 2020-09-11 VITALS
HEART RATE: 77 BPM | HEIGHT: 65 IN | SYSTOLIC BLOOD PRESSURE: 143 MMHG | WEIGHT: 250 LBS | BODY MASS INDEX: 41.65 KG/M2 | DIASTOLIC BLOOD PRESSURE: 89 MMHG

## 2020-09-11 DIAGNOSIS — E03.9 HYPOTHYROIDISM, UNSPECIFIED TYPE: Primary | ICD-10-CM

## 2020-09-11 DIAGNOSIS — E04.1 THYROID NODULE: ICD-10-CM

## 2020-09-11 PROCEDURE — 3077F SYST BP >= 140 MM HG: CPT | Performed by: INTERNAL MEDICINE

## 2020-09-11 PROCEDURE — 3079F DIAST BP 80-89 MM HG: CPT | Performed by: INTERNAL MEDICINE

## 2020-09-11 PROCEDURE — 3008F BODY MASS INDEX DOCD: CPT | Performed by: INTERNAL MEDICINE

## 2020-09-11 PROCEDURE — 99203 OFFICE O/P NEW LOW 30 MIN: CPT | Performed by: INTERNAL MEDICINE

## 2020-09-11 RX ORDER — LEVOTHYROXINE SODIUM 137 UG/1
TABLET ORAL
Qty: 36 TABLET | Refills: 0 | Status: SHIPPED | OUTPATIENT
Start: 2020-09-11 | End: 2020-12-08

## 2020-09-11 NOTE — H&P
New Patient Evaluation - History and Physical    CONSULT - Reason for Visit: Thyroid nodules  Requesting Physician: Dr. Lisa Barajas:    Thyroid nodules     HISTORY OF PRESENT ILLNESS:   Shannon Sarabia is a 50year old female who presents daily 4 days a week 36 tablet 0   • LEVOTHYROXINE SODIUM 125 MCG Oral Tab TAKE 1 TABLET BY MOUTH ONCE DAILY 90 tablet 1   • VENTOLIN  (90 Base) MCG/ACT Inhalation Aero Soln INHALE 2 PUFFS BY MOUTH EVERY 6 HOURS AS NEEDED FOR WHEEZING 18 Inhaler 0 cough  Cardiovascular: Negative for:  chest pain, palpitations, orthopnea  GI: Negative for:  abdominal pain, nausea, vomiting, diarrhea, constipation, bleeding  Neurology: Negative for: headache, numbness, weakness  Genito-Urinary: Negative for: dysuria, one dimension. On the left, the big complex cystic nodule looks stable. Clinically no compressive symptoms.      We reviewed Surgery vs FNA of the new nodule vs aspiration of the cyst vs monitoring    PLAN:   Patient will like to monitor with US in 6 mo

## 2020-10-23 ENCOUNTER — NURSE TRIAGE (OUTPATIENT)
Dept: INTERNAL MEDICINE CLINIC | Facility: CLINIC | Age: 55
End: 2020-10-23

## 2020-10-23 NOTE — TELEPHONE ENCOUNTER
Action Requested: Summary for Provider     []  Critical Lab, Recommendations Needed  [] Need Additional Advice  []   FYI    []   Need Orders  [] Need Medications Sent to Pharmacy  []  Other     SUMMARY: Spoke with patient regarding MyChart message below.  P

## 2020-10-23 NOTE — TELEPHONE ENCOUNTER
----- Message from [address] [place] [address]. Yasmin Dee sent at 10/23/2020  4:20 PM CDT -----  Regarding: Non-Urgent Medical Question  Contact: [phone]  Hi, I seem to have a rash- little bumps on my face that started by my right ear 4 days ago.  Now it has spread across m

## 2020-10-27 ENCOUNTER — TELEMEDICINE (OUTPATIENT)
Dept: INTERNAL MEDICINE CLINIC | Facility: CLINIC | Age: 55
End: 2020-10-27
Payer: COMMERCIAL

## 2020-10-27 DIAGNOSIS — R21 RASH: Primary | ICD-10-CM

## 2020-10-27 DIAGNOSIS — Z12.11 COLON CANCER SCREENING: ICD-10-CM

## 2020-10-27 PROCEDURE — 99213 OFFICE O/P EST LOW 20 MIN: CPT | Performed by: INTERNAL MEDICINE

## 2020-10-27 NOTE — PROGRESS NOTES
Patient ID: Samantha Pascual is a 50year old female. Patient presents with:  Rash         HISTORY OF PRESENT ILLNESS:   Patient presents for above. This visit is conducted using Telemedicine with live, interactive video and audio.   C/c rash x 5 days Socioeconomic History      Marital status:       Spouse name: Not on file      Number of children: Not on file      Years of education: Not on file      Highest education level: Not on file    Occupational History      Not on file    Social Needs IMMUNOASSAY       No follow-ups on file. Time spent on encounter  15 minutes   Video time 15 minutes   Documentation time 15 minutes     Dana Shell understands video evaluation is not a substitute for face-to-face examination or emergency care. MD  10/27/2020

## 2020-12-07 ENCOUNTER — PATIENT MESSAGE (OUTPATIENT)
Dept: INTERNAL MEDICINE CLINIC | Facility: CLINIC | Age: 55
End: 2020-12-07

## 2020-12-07 ENCOUNTER — TELEPHONE (OUTPATIENT)
Dept: INTERNAL MEDICINE CLINIC | Facility: CLINIC | Age: 55
End: 2020-12-07

## 2020-12-08 ENCOUNTER — TELEMEDICINE (OUTPATIENT)
Dept: INTERNAL MEDICINE CLINIC | Facility: CLINIC | Age: 55
End: 2020-12-08
Payer: COMMERCIAL

## 2020-12-08 DIAGNOSIS — U07.1 COVID-19: Primary | ICD-10-CM

## 2020-12-08 PROCEDURE — 99213 OFFICE O/P EST LOW 20 MIN: CPT | Performed by: INTERNAL MEDICINE

## 2020-12-08 RX ORDER — LEVOTHYROXINE SODIUM 137 UG/1
TABLET ORAL
Qty: 36 TABLET | Refills: 0 | COMMUNITY
Start: 2020-12-08 | End: 2020-12-17

## 2020-12-08 NOTE — TELEPHONE ENCOUNTER
What  was your temp today? 95.3    How did you take your temp? Infrared   Are you feeling short of breath today? No-o2 sat 99% HR-83    Is the shortness of breath better, the same, or worse than yesterday?    an/a    Are you having a cough today

## 2020-12-08 NOTE — PROGRESS NOTES
Patient ID: Rajiv Noriega is a 50year old female. Patient presents with: Follow - Up         HISTORY OF PRESENT ILLNESS:   Patient presents for above. This visit is conducted using Telemedicine with live, interactive video and audio.   C/c covid + TONSILLECTOMY  1982         Current Outpatient Medications:   •  Levothyroxine Sodium 137 MCG Oral Tab, Take one tablet three days a week Take 125 mcg daily 4 days a week, Disp: 36 tablet, Rfl: 0  •  LEVOTHYROXINE SODIUM 125 MCG Oral Tab, TAKE 1 TABLET BY Active member of club or organization: Not on file        Attends meetings of clubs or organizations: Not on file        Relationship status: Not on file      Intimate partner violence        Fear of current or ex partner: Not on file        Emotionally ab be performed. The patient was advised to call 911 or to go to the ER in case there was an emergency. The patient was also advised of the potential privacy & security concerns related to the telehealth platform.    The patient was made aware of where to fi

## 2020-12-08 NOTE — TELEPHONE ENCOUNTER
[employer] team will do home monitoring   PLEASE DO NOT CLOSE THIS ENCOUNTER. Infection banner updated. Y'all sent.    ----- Message from [facility].  Sourav Andrew sent at 12/7/2020  5:42 PM CST -----  Regarding: Other  Contact: [phone]  Raylene Severin (spouse)

## 2020-12-08 NOTE — TELEPHONE ENCOUNTER
CSS=please assists for Doximity appointment. Dr Jose M Cruz xray results attached from previous message 12/7/20. From: Leonila Adame  To: Bo Montilla MD  Sent: 12/7/2020  7:35 PM CST  Subject: Other    Per my previous email.  Please forward my

## 2020-12-08 NOTE — TELEPHONE ENCOUNTER
See Infection encounter 12/7/20      From: Hemanth Domínguez  To: Flores Fields MD  Sent: 12/7/2020  5:42 PM CST  Subject: Other    Leyla  (spouse) tested postive for Covid on Nov 25  & I tested negative on Nov 26.  We  & I wear a double ma

## 2020-12-14 ENCOUNTER — PATIENT MESSAGE (OUTPATIENT)
Dept: INTERNAL MEDICINE CLINIC | Facility: CLINIC | Age: 55
End: 2020-12-14

## 2020-12-14 DIAGNOSIS — R05.9 COUGH: Primary | ICD-10-CM

## 2020-12-14 NOTE — TELEPHONE ENCOUNTER
From: Kylah Verdugo  To: Oswaldo Frye MD  Sent: 12/14/2020 10:16 AM CST  Subject: Non-Urgent Medical Question    Hi, I am through my Covid isolation and generally feeling ok. I do not have a fever and my oxygen level is 97%.  My energy is not at full

## 2020-12-16 ENCOUNTER — LAB ENCOUNTER (OUTPATIENT)
Dept: LAB | Age: 55
End: 2020-12-16
Attending: INTERNAL MEDICINE
Payer: COMMERCIAL

## 2020-12-16 ENCOUNTER — HOSPITAL ENCOUNTER (OUTPATIENT)
Dept: ULTRASOUND IMAGING | Age: 55
Discharge: HOME | End: 2020-12-16
Attending: INTERNAL MEDICINE
Payer: COMMERCIAL

## 2020-12-16 DIAGNOSIS — E04.1 THYROID NODULE: ICD-10-CM

## 2020-12-16 PROCEDURE — 76536 US EXAM OF HEAD AND NECK: CPT | Performed by: INTERNAL MEDICINE

## 2020-12-16 PROCEDURE — 84443 ASSAY THYROID STIM HORMONE: CPT | Performed by: INTERNAL MEDICINE

## 2020-12-16 PROCEDURE — 36415 COLL VENOUS BLD VENIPUNCTURE: CPT | Performed by: INTERNAL MEDICINE

## 2020-12-16 PROCEDURE — 84439 ASSAY OF FREE THYROXINE: CPT | Performed by: INTERNAL MEDICINE

## 2020-12-17 RX ORDER — LEVOTHYROXINE SODIUM 0.12 MG/1
TABLET ORAL
Qty: 90 TABLET | Refills: 1 | Status: SHIPPED | OUTPATIENT
Start: 2020-12-17 | End: 2021-08-16

## 2020-12-17 RX ORDER — LEVOTHYROXINE SODIUM 137 UG/1
TABLET ORAL
Qty: 36 TABLET | Refills: 0 | Status: SHIPPED | OUTPATIENT
Start: 2020-12-17 | End: 2020-12-18

## 2020-12-18 ENCOUNTER — PATIENT MESSAGE (OUTPATIENT)
Dept: ENDOCRINOLOGY CLINIC | Facility: CLINIC | Age: 55
End: 2020-12-18

## 2020-12-18 ENCOUNTER — VIRTUAL PHONE E/M (OUTPATIENT)
Dept: ENDOCRINOLOGY CLINIC | Facility: CLINIC | Age: 55
End: 2020-12-18
Payer: COMMERCIAL

## 2020-12-18 DIAGNOSIS — E04.2 MULTIPLE THYROID NODULES: ICD-10-CM

## 2020-12-18 DIAGNOSIS — E03.9 HYPOTHYROIDISM, UNSPECIFIED TYPE: Primary | ICD-10-CM

## 2020-12-18 PROCEDURE — 99213 OFFICE O/P EST LOW 20 MIN: CPT | Performed by: INTERNAL MEDICINE

## 2020-12-18 NOTE — PROGRESS NOTES
Virtual Telephone Check-In    Quadra 89 verbally consents to a Virtual/Telephone Check-In visit on 12/18/20. Patient has been referred to the [facility] website at [email or web address] to review the yearly Consent to Treat document.     Patient unde MCG/ACT Inhalation Aero Soln, INHALE 2 PUFFS BY MOUTH EVERY 6 HOURS AS NEEDED FOR WHEEZING, Disp: 18 Inhaler, Rfl: 0    •  Multiple Vitamins-Minerals (MULTIVITAMIN OR), Take by mouth., Disp: , Rfl:     •  Omega-3 Fatty Acids (FISH OIL OR), Take by mouth., depression, anxiety  Hematology/Lymphatics: Negative for: bruising, lower extremity edema  Endocrine: Negative for: polyuria, polydypsia  Skin: Negative for: rash, blister, cellulitis,        PHYSICAL EXAM:   Constitutional: He is not in acute distress  Pu Joel De Los Santos MD        Please note that the following visit was completed using two-way, real-time interactive audio  communication.   This has been done in good cindy to provide continuity of care in the best interest of the provider-patient

## 2020-12-21 NOTE — TELEPHONE ENCOUNTER
From: Percy Pelletier  To: Amy Fitch MD  Sent: 12/18/2020 9:47 AM CST  Subject: Visit Follow-up Question    Per your message: Sorry about the confusion Are you free for a phone visit today?  Around 12:45 pm? Please let me know, Thanks AM    Chris

## 2020-12-22 ENCOUNTER — LAB ENCOUNTER (OUTPATIENT)
Dept: LAB | Facility: REFERENCE LAB | Age: 55
End: 2020-12-22
Attending: INTERNAL MEDICINE
Payer: COMMERCIAL

## 2020-12-22 PROCEDURE — 82274 ASSAY TEST FOR BLOOD FECAL: CPT | Performed by: INTERNAL MEDICINE

## 2020-12-23 ENCOUNTER — HOSPITAL ENCOUNTER (OUTPATIENT)
Dept: GENERAL RADIOLOGY | Age: 55
Discharge: HOME | End: 2020-12-23
Attending: INTERNAL MEDICINE
Payer: COMMERCIAL

## 2020-12-23 DIAGNOSIS — R05.9 COUGH: ICD-10-CM

## 2020-12-23 PROCEDURE — 71046 X-RAY EXAM CHEST 2 VIEWS: CPT | Performed by: INTERNAL MEDICINE

## 2021-01-12 ENCOUNTER — OFFICE VISIT (OUTPATIENT)
Dept: INTEGRATIVE MEDICINE | Facility: CLINIC | Age: 56
End: 2021-01-12
Payer: COMMERCIAL

## 2021-01-12 VITALS
SYSTOLIC BLOOD PRESSURE: 120 MMHG | WEIGHT: 241.38 LBS | DIASTOLIC BLOOD PRESSURE: 82 MMHG | OXYGEN SATURATION: 96 % | HEART RATE: 80 BPM | HEIGHT: 65 IN | BODY MASS INDEX: 40.22 KG/M2

## 2021-01-12 DIAGNOSIS — R53.82 CHRONIC FATIGUE: ICD-10-CM

## 2021-01-12 DIAGNOSIS — E03.9 HYPOTHYROIDISM, UNSPECIFIED TYPE: ICD-10-CM

## 2021-01-12 DIAGNOSIS — L65.9 HAIR LOSS: ICD-10-CM

## 2021-01-12 DIAGNOSIS — F51.01 PRIMARY INSOMNIA: ICD-10-CM

## 2021-01-12 DIAGNOSIS — E04.9 GOITER: Primary | ICD-10-CM

## 2021-01-12 DIAGNOSIS — E06.3 HASHIMOTO'S THYROIDITIS: ICD-10-CM

## 2021-01-12 PROCEDURE — 99204 OFFICE O/P NEW MOD 45 MIN: CPT | Performed by: FAMILY MEDICINE

## 2021-01-12 PROCEDURE — 90686 IIV4 VACC NO PRSV 0.5 ML IM: CPT | Performed by: FAMILY MEDICINE

## 2021-01-12 PROCEDURE — 90471 IMMUNIZATION ADMIN: CPT | Performed by: FAMILY MEDICINE

## 2021-01-12 PROCEDURE — 3008F BODY MASS INDEX DOCD: CPT | Performed by: FAMILY MEDICINE

## 2021-01-12 PROCEDURE — 3074F SYST BP LT 130 MM HG: CPT | Performed by: FAMILY MEDICINE

## 2021-01-12 PROCEDURE — 3079F DIAST BP 80-89 MM HG: CPT | Performed by: FAMILY MEDICINE

## 2021-01-12 NOTE — PROGRESS NOTES
Thuan Cordova is a 50year old female. Patient presents with: Integrative Medicine Consult: thyroid issues       HPI:     Has large nodule on the left lobe of her thyroid, has been stable, harmless.    Recently found to have 2 small nodules on the ri • LEVOTHYROXINE SODIUM 125 MCG Oral Tab TAKE 1 TABLET BY MOUTH EVERY DAY 90 tablet 1   • VENTOLIN  (90 Base) MCG/ACT Inhalation Aero Soln INHALE 2 PUFFS BY MOUTH EVERY 6 HOURS AS NEEDED FOR WHEEZING 18 Inhaler 0       SOCIAL HISTORY:   Social Histor 1047   BP: 120/82   Pulse: 80   SpO2: 96%   Weight: 241 lb 6.4 oz (109.5 kg)   Height: 5' 5\" (1.651 m)       Physical Exam   Constitutional: She is oriented to person, place, and time and well-developed, well-nourished, and in no distress.    HENT:   Head: Ferritin level ordered. Low ferritin can impact energy levels and overall body function. Counseled on and discussed supplement recommendations in detail.   Radha Cast testing performed today to evaluate for food sensitivities that may relate to GI symptoms and The products and items listed below (the “Products”)  and their claims have not been evaluated by the Food and Drug Administration. Dietary products are not intended to treat, prevent, mitigate or cure disease.  Ultimately, you must draw your own conclusion This is a Food Sensitivity Test that measures sensitivities to up to 132 different foods, coloring and additives.  The Food Inflammation Test, also known as the FIT Test, was created by Sindy Linda, Ph. D, who was involved in the creation of the first HIV/

## 2021-01-12 NOTE — PATIENT INSTRUCTIONS
I have complete cindy in the body's ability to heal and transform. The products and items listed below (the “Products”)  and their claims have not been evaluated by the Food and Drug Administration.  Dietary products are not intended to treat, prevent, m the FIT Test, was created by Henriette Peabody, Ph. D, who was involved in the creation of the first HIV/AIDS rapid diagnostic assay.   This FIT Test yields many benefits, including: uncovering which foods are causing inflammation and disease, developing a perso

## 2021-02-01 LAB
C.ALBICANS IGA: 0.3
C.ALBICANS IGG: 1.4
C.ALBICANS IGM: 0.2
DHEA SULFATE: 91 MCG/DL (ref 8–188)
EPSTEIN BARR VIRUS (EBNA)$ANTIBODY (IGG): 246 U/ML
EPSTEIN BARR VIRUS VCA$ANTIBODY (IGG): >750 U/ML
EPSTEIN BARR VIRUS VCA$ANTIBODY (IGM): <36 U/ML
FERRITIN: 33 NG/ML (ref 16–232)
MAGNESIUM: 2.3 MG/DL (ref 1.5–2.5)
THYROGLOBULIN ANTIBODIES: <1 IU/ML
THYROID PEROXIDASE$ANTIBODIES: 61 IU/ML
VITAMIN B12: 408 PG/ML (ref 200–1100)
VITAMIN B6: 60.5 NG/ML (ref 2.1–21.7)
VITAMIN D, 25-OH, TOTAL: 17 NG/ML (ref 30–100)

## 2021-02-23 ENCOUNTER — OFFICE VISIT (OUTPATIENT)
Dept: INTEGRATIVE MEDICINE | Facility: CLINIC | Age: 56
End: 2021-02-23
Payer: COMMERCIAL

## 2021-02-23 VITALS
SYSTOLIC BLOOD PRESSURE: 132 MMHG | BODY MASS INDEX: 40.29 KG/M2 | HEART RATE: 78 BPM | HEIGHT: 65 IN | OXYGEN SATURATION: 98 % | DIASTOLIC BLOOD PRESSURE: 76 MMHG | WEIGHT: 241.81 LBS

## 2021-02-23 DIAGNOSIS — L65.9 HAIR LOSS: ICD-10-CM

## 2021-02-23 DIAGNOSIS — R53.82 CHRONIC FATIGUE: ICD-10-CM

## 2021-02-23 DIAGNOSIS — E06.3 HASHIMOTO'S THYROIDITIS: ICD-10-CM

## 2021-02-23 DIAGNOSIS — E03.9 HYPOTHYROIDISM, UNSPECIFIED TYPE: ICD-10-CM

## 2021-02-23 DIAGNOSIS — Z71.89 ENCOUNTER FOR HERB AND VITAMIN SUPPLEMENT MANAGEMENT: ICD-10-CM

## 2021-02-23 DIAGNOSIS — F51.01 PRIMARY INSOMNIA: ICD-10-CM

## 2021-02-23 PROCEDURE — 3008F BODY MASS INDEX DOCD: CPT | Performed by: FAMILY MEDICINE

## 2021-02-23 PROCEDURE — 3075F SYST BP GE 130 - 139MM HG: CPT | Performed by: FAMILY MEDICINE

## 2021-02-23 PROCEDURE — 3078F DIAST BP <80 MM HG: CPT | Performed by: FAMILY MEDICINE

## 2021-02-23 PROCEDURE — 99214 OFFICE O/P EST MOD 30 MIN: CPT | Performed by: FAMILY MEDICINE

## 2021-02-23 NOTE — PATIENT INSTRUCTIONS
I have complete cindy in the body's ability to heal and transform. The products and items listed below (the “Products”)  and their claims have not been evaluated by the Food and Drug Administration.  Dietary products are not intended to treat, prevent, m Combine in  and mix until smooth. [place] Butter Shake:    · 8 ounces vanilla almond milk  · 2 tablespoons of almond butter  · 1 scoop of vanilla protein powder  Combine in  and mix until smooth.              Check out the medical medium rec

## 2021-02-23 NOTE — PROGRESS NOTES
Leonila Adame is a 49year old female. Patient presents with: Integrative Medicine Consult: 6 wk f/u      HPI:     Not taking the supplements in the middle of the day always. Tolerating the supplements well. Slowly adjusting her diet.   Feeling mor Medical: Not on file        Non-medical: Not on file    Tobacco Use      Smoking status: Never Smoker      Smokeless tobacco: Never Used    Substance and Sexual Activity      Alcohol use: Yes        Frequency: Monthly or less        Drinks per sessio Psychiatric: Affect normal.       ASSESSMENT AND PLAN:     Office Visit on 01/12/2021   Component Date Value Ref Range Status   • C. ALBICANS IGG 01/27/2021 1.4*  Final   • C. ALBICANS IGA 01/27/2021 0.3   Final   • C. ALBICANS IGM 01/27/2021 0.2   Final    C Comment:        U/mL             Interpretation         ----             --------------         <18.00           Negative         18.00-21.99      Equivocal         >21.99           Positive     • INTERPRETATION: 01/27/2021    Final    Comment:    Suggest For reference, the reference intervals for 32-40 year  old patients are:     Male:   106-464 mcg/dL  Female:   mcg/dL        Telephone on 12/07/2020   Component Date Value Ref Range Status   • COVID19 12/07/2020 Detected* Not Detected Final   Telemed This test may exhibit interference when a sample is collected from a person who is consuming high dose of biotin (a.k.a., vitamin B7, vitamin H, coenzyme R) supplements resulting in serum concentrations >100 ng/mL.   Intake of the recommended daily allowanc **MEDICAL NUTRITIONAL THERAPY ([facility]) - INTERNAL REFERRAL**      Patient Instructions     I have complete cindy in the body's ability to heal and transform.     The products and items listed below (the “Products”)  and their claims have not · 1 1/2 ice and water or cold decaf coffee   · 1-2 scoops vanilla protein powder  · 1/8 tsp pumpkin pie spice or to taste  Combine in  and mix until smooth.     [place] Butter Shake:    · 8 ounces vanilla almond milk  · 2 tablespoons of almond butter

## 2021-02-24 RX ORDER — ALBUTEROL SULFATE 90 UG/1
2 AEROSOL, METERED RESPIRATORY (INHALATION) EVERY 6 HOURS PRN
COMMUNITY
End: 2021-05-12 | Stop reason: ALTCHOICE

## 2021-03-01 ENCOUNTER — PATIENT MESSAGE (OUTPATIENT)
Dept: INTERNAL MEDICINE CLINIC | Facility: CLINIC | Age: 56
End: 2021-03-01

## 2021-03-01 DIAGNOSIS — D22.9 BENIGN MOLE: Primary | ICD-10-CM

## 2021-03-01 NOTE — TELEPHONE ENCOUNTER
From: Eric De Jesus  To: Natalia Edwards MD  Sent: 3/1/2021 10:50 AM CST  Subject: Referral Request    Hi, Can you please suggest a dermatologist? I value your opinion.  I have a new mole on my stomach that is round but is raised and I would like it to b

## 2021-04-05 ENCOUNTER — OFFICE VISIT (OUTPATIENT)
Dept: DERMATOLOGY CLINIC | Facility: CLINIC | Age: 56
End: 2021-04-05
Payer: COMMERCIAL

## 2021-04-05 DIAGNOSIS — D22.9 MULTIPLE NEVI: ICD-10-CM

## 2021-04-05 DIAGNOSIS — L82.1 SEBORRHEIC KERATOSES: ICD-10-CM

## 2021-04-05 DIAGNOSIS — D48.5 NEOPLASM OF UNCERTAIN BEHAVIOR OF SKIN: Primary | ICD-10-CM

## 2021-04-05 DIAGNOSIS — L40.0 PSORIASIS VULGARIS: ICD-10-CM

## 2021-04-05 DIAGNOSIS — B35.1 ONYCHOMYCOSIS: ICD-10-CM

## 2021-04-05 PROCEDURE — 11102 TANGNTL BX SKIN SINGLE LES: CPT | Performed by: DERMATOLOGY

## 2021-04-05 PROCEDURE — 99203 OFFICE O/P NEW LOW 30 MIN: CPT | Performed by: DERMATOLOGY

## 2021-04-05 RX ORDER — SAL ACID/UREA/PETROLATUM,WHITE 5 %-10 %
1 OINTMENT (GRAM) TOPICAL DAILY
Qty: 10 ML | Refills: 0 | Status: SHIPPED | OUTPATIENT
Start: 2021-04-05

## 2021-04-08 ENCOUNTER — TELEPHONE (OUTPATIENT)
Dept: DERMATOLOGY CLINIC | Facility: CLINIC | Age: 56
End: 2021-04-08

## 2021-04-08 NOTE — TELEPHONE ENCOUNTER
[facility] asking for approval to send pt's recent pathology to the [facility]. Approval given per KMT.

## 2021-04-12 ENCOUNTER — OFFICE VISIT (OUTPATIENT)
Dept: INTEGRATIVE MEDICINE | Facility: CLINIC | Age: 56
End: 2021-04-12

## 2021-04-12 DIAGNOSIS — T78.1XXA FOOD SENSITIVITY WITH GASTROINTESTINAL SYMPTOMS: ICD-10-CM

## 2021-04-12 DIAGNOSIS — Z71.3 NUTRITIONAL COUNSELING: ICD-10-CM

## 2021-04-12 DIAGNOSIS — B37.9 CANDIDIASIS: ICD-10-CM

## 2021-04-12 PROCEDURE — S9452 NUTRITION CLASS: HCPCS | Performed by: NUTRITIONIST

## 2021-04-12 NOTE — PROGRESS NOTES
Patient referred by Dr. Margie Mendoza  Did patient complete Nutritional Therapy Questionnaire? YES  Email: [email or web address]    Tyrone Anderson is a 49year old female presenting for medical nutrition therapy in regards to support for sustaining current d 2/23/21    Assessment    Discuss roll of candida and connection to increased intestinal permeability. Emphasize importance of longevity of diet to decrease inflammation. Provide meal ideas and options. Discuss implementation of time restricted eating.

## 2021-04-18 NOTE — TELEPHONE ENCOUNTER
Update. Current pathology report listed as in progress. This was sent to [facility].   Please see if [facility] does have an update regarding this

## 2021-04-18 NOTE — PROGRESS NOTES
Carola Oliva is a 49year old female. HPI:     CC:  Patient presents with:  Full Skin Exam: New pt. pt presenting today with full body skin check. Denies family and personal HX of skin cancer.         Allergies:  Dust Mites    HISTORY:    Past Medica Never used    Substance and Sexual Activity      Alcohol use: Yes        Comment: socially      Drug use: No      Sexual activity: Not Currently        Partners: Male    Other Topics      Concerns:        Grew up on a farm: Not Asked        History of tann concerns above. Patient has been in their usual state of health. History, medications, allergies reviewed as noted. ROS:  Denies any other systemic complaints. No new or changeing lesions other than noted above.  No fevers, chills, night sweats, u neurofibroma versus other, atypical lesion. Shave/tangential biopsy performed, operative note and consent in chart further plans pending pathology    Onychomycotic thickened nail, Kerasal.  Pathophysiology discussed with patient.   Therapeutic options revi and the lesion was incised using a #15 scalpel blade. The specimen was sent for histopathologic exam.    Hemostasis was obtained with electrocautery/aluminum chloride. Estimated blood loss less than 2 cc. Biopsy dressed with Polysporin, bandage.     Pr

## 2021-04-19 NOTE — TELEPHONE ENCOUNTER
Called [facility] corporate office at [phone] - they have not received results from [facility] yet.

## 2021-04-20 NOTE — TELEPHONE ENCOUNTER
Yes, will leave note in triage room.     Staff please see Dr. Marcos Law' request to follow up on results

## 2021-05-06 ENCOUNTER — PATIENT MESSAGE (OUTPATIENT)
Dept: DERMATOLOGY CLINIC | Facility: CLINIC | Age: 56
End: 2021-05-06

## 2021-05-06 PROBLEM — L98.9 SCALP LESION: Status: ACTIVE | Noted: 2021-05-05

## 2021-05-06 NOTE — PROGRESS NOTES
Please log in results. 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

## 2021-05-06 NOTE — PROGRESS NOTES
Spoke with pt. Reviewed path. This should have a complete excision. Possibly a multi disciplinary team.  Dr. Geovanna Gee did review and his office will contact her tomorrow. Pt informed.

## 2021-05-06 NOTE — TELEPHONE ENCOUNTER
LOV 4/5/2021 - Pt asking for further explanation of her pathology results. Please advise. Thank you.

## 2021-05-07 NOTE — TELEPHONE ENCOUNTER
Reviewed with pt over the phone. Should have further excision. She will see Dr. Santino Faria, his office will contact pt to schedule. This may require multiple specialties including Plastics and if needed Mohs to clear margins.  Not clear for bx since this w

## 2021-05-10 ENCOUNTER — OFFICE VISIT (OUTPATIENT)
Dept: INTEGRATIVE MEDICINE | Facility: CLINIC | Age: 56
End: 2021-05-10

## 2021-05-10 DIAGNOSIS — Z71.3 NUTRITIONAL COUNSELING: ICD-10-CM

## 2021-05-10 PROCEDURE — S9452 NUTRITION CLASS: HCPCS | Performed by: NUTRITIONIST

## 2021-05-10 NOTE — PROGRESS NOTES
Patient referred by Dr. Nino Watson  Did patient complete Nutritional Therapy Questionnaire? YES  Email: [email or web address]    Elizabeth Andrews is a 49year old female presenting for medical nutrition therapy in regards to support for sustaining current d

## 2021-05-12 ENCOUNTER — OFFICE VISIT (OUTPATIENT)
Dept: SURGERY | Facility: CLINIC | Age: 56
End: 2021-05-12
Payer: COMMERCIAL

## 2021-05-12 VITALS
BODY MASS INDEX: 39.82 KG/M2 | RESPIRATION RATE: 18 BRPM | OXYGEN SATURATION: 96 % | DIASTOLIC BLOOD PRESSURE: 82 MMHG | SYSTOLIC BLOOD PRESSURE: 127 MMHG | WEIGHT: 239 LBS | HEART RATE: 76 BPM | HEIGHT: 65 IN

## 2021-05-12 DIAGNOSIS — L98.9 SCALP LESION: Primary | ICD-10-CM

## 2021-05-12 DIAGNOSIS — C44.99 DERMATOFIBROMA PROTUBERANS: ICD-10-CM

## 2021-05-12 PROCEDURE — 3079F DIAST BP 80-89 MM HG: CPT | Performed by: SURGERY

## 2021-05-12 PROCEDURE — 99205 OFFICE O/P NEW HI 60 MIN: CPT | Performed by: SURGERY

## 2021-05-12 PROCEDURE — 3008F BODY MASS INDEX DOCD: CPT | Performed by: SURGERY

## 2021-05-12 PROCEDURE — 3074F SYST BP LT 130 MM HG: CPT | Performed by: SURGERY

## 2021-05-12 RX ORDER — CELERY SEED OIL
OIL (ML) MISCELLANEOUS
COMMUNITY
End: 2021-05-20 | Stop reason: CLARIF

## 2021-05-12 RX ORDER — MULTIVIT-MIN/IRON/FOLIC ACID/K 18-600-40
CAPSULE ORAL
COMMUNITY
End: 2021-05-20 | Stop reason: DRUGHIGH

## 2021-05-12 NOTE — PATIENT INSTRUCTIONS
Surgery: Wide local excision R scalp soft tissue lesion     Date of Surgery: [facility]    Hospital:    [address]   Phone: [phone]    · This is an outpatient procedure.   · Use the provided Chlorhexadine s Clearance     Dr. Chelsey Peck nurse direct line:  [phone]  Monday through Friday 8:30 am to 4:30 pm    For Dr. Chelsey Peck office: [phone]/ Fax: [phone]  After hours you will reach the answering service     Central [phone] Ed

## 2021-05-13 NOTE — CONSULTS
[facility]    Patient Name:  Eric De Jesus   YOB: 1965   Gender:  Female   Appt Date:  5/12/2021   Provider:  Agnieszka Vega MD   Insurance:  [address] large)   Pulse 76   Resp 18   Ht 1.651 m (5' 5\")   Wt 108.4 kg (239 lb)   LMP  (LMP Unknown)   SpO2 96%   BMI 39.77 kg/m²      Medications Reviewed:    Current Outpatient Medications:   •  Multiple Vitamins-Iron (DAILY MULTIPLE VITAMIN/IRON OR), Take by m Currently        Partners: Male    Other Topics      Concerns:        Reaction to local anesthetic: No     Reviewed:  Family History   Problem Relation Age of Onset   • Hypertension Mother    • Heart Disorder Mother    • Diabetes Mother    • Stroke Mother are positive).  All positive and required   negative controls stained appropriately.               A consultation will be obtained from [facility] and a final report will follow at a   later date.  Dr. Eugenio Astudillo office was notified that more definitive diagnosis. In addition, given the   unusual negative result with PDGFB, additional   molecular test to look for uncommon gene   translocations can be performed if this is a   clinically indicated.  We will be very glad to   review any young explained that once completely excised, prognosis is generally good. All questions were answered. We will find a date for surgery. The patient will meet with Dr. Bradley Echavarria this week.     Yoan Cervantes MD  Complex General Surgical Oncology  [facility]

## 2021-05-14 ENCOUNTER — OFFICE VISIT (OUTPATIENT)
Dept: SURGERY | Facility: CLINIC | Age: 56
End: 2021-05-14
Payer: COMMERCIAL

## 2021-05-14 DIAGNOSIS — C44.99 DERMATOFIBROMA PROTUBERANS: Primary | ICD-10-CM

## 2021-05-14 PROCEDURE — 99203 OFFICE O/P NEW LOW 30 MIN: CPT | Performed by: SURGERY

## 2021-05-14 NOTE — PATIENT INSTRUCTIONS
Surgeon:              Dr. Heydi Malik. Bradley Echavarria, PhD                                          Tel:         [phone]                                  Fax:        [phone]    Surgery/Procedure:   1st Procedure- Scalp Reconstruction with Integra placement.  Pia Apley

## 2021-05-15 ENCOUNTER — PATIENT MESSAGE (OUTPATIENT)
Dept: SURGERY | Facility: CLINIC | Age: 56
End: 2021-05-15

## 2021-05-15 PROBLEM — C44.99 DERMATOFIBROSARCOMA PROTUBERANS: Status: ACTIVE | Noted: 2021-05-15

## 2021-05-15 NOTE — CONSULTS
New Patient Consultation    Chief Complaint:  Patient presents with:  Consult  DFSP    History of Present Illness:   Leonila Adame is a 49year old female referred by Dr. 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  She had this lesion for many years but only rece History    Socioeconomic History      Marital status:       Spouse name: Not on file      Number of children: Not on file      Years of education: Not on file      Highest education level: Not on file    Tobacco Use      Smoking status: Never Smoker urinate, urinary hesitancy or retaining urine, painful urination, urinary incontinence, decreased urine stream, blood in the urine, or vaginal/penile discharge.   Skin:   The patient denies rash, itching, +skin lesions, dry skin, change in skin color or gretel to assure negative margins and second stage skin graft vs local flap dependent on defect size and pts preference. Pt has some thinning of her central hair in the crown/ bi-parietal area.     She will be schedule for first stage 45 min my part, second stag

## 2021-05-17 ENCOUNTER — TELEPHONE (OUTPATIENT)
Dept: SURGERY | Facility: CLINIC | Age: 56
End: 2021-05-17

## 2021-05-17 NOTE — TELEPHONE ENCOUNTER
Called pt as a f/u to her [employer] message. Pt had several unanswered questions. I spoke to this pt in great length listening to her concerns and answering some of her questions to the best of my ability.  Asked what I could do to help provide clarity at [facility]

## 2021-05-20 ENCOUNTER — OFFICE VISIT (OUTPATIENT)
Dept: INTERNAL MEDICINE CLINIC | Facility: CLINIC | Age: 56
End: 2021-05-20
Payer: COMMERCIAL

## 2021-05-20 VITALS
HEIGHT: 65 IN | HEART RATE: 80 BPM | DIASTOLIC BLOOD PRESSURE: 85 MMHG | SYSTOLIC BLOOD PRESSURE: 132 MMHG | BODY MASS INDEX: 39.49 KG/M2 | WEIGHT: 237 LBS

## 2021-05-20 DIAGNOSIS — C44.99 DERMATOFIBROSARCOMA PROTUBERANS: Primary | ICD-10-CM

## 2021-05-20 DIAGNOSIS — E06.3 HASHIMOTO'S THYROIDITIS: ICD-10-CM

## 2021-05-20 PROBLEM — L98.9 SCALP LESION: Status: RESOLVED | Noted: 2021-05-05 | Resolved: 2021-05-20

## 2021-05-20 PROCEDURE — 99214 OFFICE O/P EST MOD 30 MIN: CPT | Performed by: INTERNAL MEDICINE

## 2021-05-20 PROCEDURE — 3079F DIAST BP 80-89 MM HG: CPT | Performed by: INTERNAL MEDICINE

## 2021-05-20 PROCEDURE — 3008F BODY MASS INDEX DOCD: CPT | Performed by: INTERNAL MEDICINE

## 2021-05-20 PROCEDURE — 3075F SYST BP GE 130 - 139MM HG: CPT | Performed by: INTERNAL MEDICINE

## 2021-05-20 RX ORDER — CHOLECALCIFEROL (VITAMIN D3) 125 MCG
5000 CAPSULE ORAL DAILY
COMMUNITY

## 2021-05-20 RX ORDER — OCTANOIC ACID 100 %
600 LIQUID (ML) MISCELLANEOUS DAILY
COMMUNITY

## 2021-05-21 ENCOUNTER — MED REC SCAN ONLY (OUTPATIENT)
Dept: DERMATOLOGY CLINIC | Facility: CLINIC | Age: 56
End: 2021-05-21

## 2021-05-21 ENCOUNTER — TELEPHONE (OUTPATIENT)
Dept: DERMATOLOGY CLINIC | Facility: CLINIC | Age: 56
End: 2021-05-21

## 2021-05-21 NOTE — PROGRESS NOTES
Patient ID: Justus Dunn is a 49year old female. Patient presents with:  Establish Care: Pt reports to office to establish care, recent diagnosis of cancer. HISTORY OF PRESENT ILLNESS:   HPI  Patient presents for above.   Here to establish OR, Take 350 mg by mouth daily. , Disp: , Rfl:   •  NON FORMULARY, Grapefruit seed extract, 125mg, Disp: , Rfl:   •  Multiple Vitamins-Iron (DAILY MULTIPLE VITAMIN/IRON OR), Take by mouth., Disp: , Rfl:   •  Omega-3 Fatty Acids (FISH OIL OR), Take by mouth. Stress:       Feeling of Stress :   Social Connections:       Frequency of Communication with Friends and Family:       Frequency of Social Gatherings with Friends and Family:       Attends Orthodox Services:       Active Member of Clubs or Organization

## 2021-05-24 ENCOUNTER — TELEPHONE (OUTPATIENT)
Dept: INTEGRATIVE MEDICINE | Facility: CLINIC | Age: 56
End: 2021-05-24

## 2021-05-24 ENCOUNTER — OFFICE VISIT (OUTPATIENT)
Dept: INTEGRATIVE MEDICINE | Facility: CLINIC | Age: 56
End: 2021-05-24
Payer: COMMERCIAL

## 2021-05-24 VITALS
HEART RATE: 78 BPM | HEIGHT: 65 IN | DIASTOLIC BLOOD PRESSURE: 82 MMHG | OXYGEN SATURATION: 97 % | BODY MASS INDEX: 39.59 KG/M2 | WEIGHT: 237.63 LBS | SYSTOLIC BLOOD PRESSURE: 138 MMHG

## 2021-05-24 DIAGNOSIS — F41.9 ANXIETY: ICD-10-CM

## 2021-05-24 DIAGNOSIS — E03.9 HYPOTHYROIDISM, UNSPECIFIED TYPE: ICD-10-CM

## 2021-05-24 DIAGNOSIS — E06.3 HASHIMOTO'S THYROIDITIS: ICD-10-CM

## 2021-05-24 DIAGNOSIS — T78.1XXA FOOD SENSITIVITY WITH GASTROINTESTINAL SYMPTOMS: ICD-10-CM

## 2021-05-24 DIAGNOSIS — F51.01 PRIMARY INSOMNIA: ICD-10-CM

## 2021-05-24 DIAGNOSIS — E66.9 OBESITY (BMI 30-39.9): ICD-10-CM

## 2021-05-24 DIAGNOSIS — B37.9 CANDIDIASIS: ICD-10-CM

## 2021-05-24 DIAGNOSIS — C44.99 DERMATOFIBROSARCOMA PROTUBERANS: Primary | ICD-10-CM

## 2021-05-24 DIAGNOSIS — Z71.89 ENCOUNTER FOR HERB AND VITAMIN SUPPLEMENT MANAGEMENT: ICD-10-CM

## 2021-05-24 DIAGNOSIS — L65.9 HAIR LOSS: ICD-10-CM

## 2021-05-24 DIAGNOSIS — R53.82 CHRONIC FATIGUE: ICD-10-CM

## 2021-05-24 PROCEDURE — 99214 OFFICE O/P EST MOD 30 MIN: CPT | Performed by: FAMILY MEDICINE

## 2021-05-24 PROCEDURE — 3079F DIAST BP 80-89 MM HG: CPT | Performed by: FAMILY MEDICINE

## 2021-05-24 PROCEDURE — 3075F SYST BP GE 130 - 139MM HG: CPT | Performed by: FAMILY MEDICINE

## 2021-05-24 PROCEDURE — 3008F BODY MASS INDEX DOCD: CPT | Performed by: FAMILY MEDICINE

## 2021-05-24 NOTE — PROGRESS NOTES
Angelaandre Gonzalez is a 49year old female. Patient presents with: Follow - Up: asha,   Cancer: Protuberams       HPI:     Seen by derm - found to have a growth on the scalp. 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   Seen by  Acids (FISH OIL OR) Take by mouth. • B Complex Vitamins (B COMPLEX OR) Take by mouth. • Magnesium 100 MG Oral Tab Take by mouth. • Dermatological Products, Misc. [facility] FUNGAL NAIL RENEWAL) External Liquid Apply 1 Application topically daily. Intimate Partner Violence:       Fear of Current or Ex-Partner:       Emotionally Abused:       Physically Abused:       Sexually Abused:     SURGICAL HISTORY:     Past Surgical History:   Procedure Laterality Date   • Tonsillectomy  1982       PHYSICAL ability to heal and transform. The products and items listed below (the “Products”)  and their claims have not been evaluated by the Food and Drug Administration. Dietary products are not intended to treat, prevent, mitigate or cure disease.  Ultimately, daily as needed  Indications: anxiety, stress, panic attacks  Where to find: Whole Foods or Fruitful Yield or other health food store. Also online.       Check out the Following Video:  How to Use Tapping to Calm Anxiety Tutorial by [address] Started with a Meditation or Deep Breathing Practice    Start with 5 minutes per day and increase amount of time spent in meditation gradually. Conscious breathing: Breathe in through your nose for 6 seconds, then out through your nose for 6 seconds.  C get used to inhaling. Use free meditations online:  https://www.[employer]/. com/DownloadMeditations. html  Sookbox.Tribe [employer].MobileReactor  http://shelly.[facility]/body. cfm?id=22  http://www. CellPhire/ccl/guided-meditations  http://www. Engagement Lab[facility]ulugetaOhio[facility].

## 2021-05-24 NOTE — PATIENT INSTRUCTIONS
I have complete cindy in the body's ability to heal and transform. The products and items listed below (the “Products”)  and their claims have not been evaluated by the Food and Drug Administration.  Dietary products are not intended to treat, prevent, m Karen by Montse Yanes      Take 1 capsule twice daily as needed  Indications: anxiety, stress, panic attacks  Where to find: Whole Foods or Fruitful Yield or other health food store. Also online.       Check out the Following Video:  How to Use Tapping to Calm Anxie changes your world. How to Get Started with a Meditation or Deep Breathing Practice    Start with 5 minutes per day and increase amount of time spent in meditation gradually.      Conscious breathing: Breathe in through your nose for 6 seconds, then out practice you can slow it all down and get used to inhaling. Use free meditations online:  https://www.BudgetSimple.Working Equity/. com/DownloadMeditations. html  MundoHablado.com.Gyros.Reflexis Systems. com  http://shelly.[facility]/body. cfm?id=22  http://www. [employer].Gyros/ccl/guided-m

## 2021-06-04 ENCOUNTER — HOSPITAL ENCOUNTER (OUTPATIENT)
Dept: CT IMAGING | Age: 56
Discharge: HOME | End: 2021-06-04
Attending: INTERNAL MEDICINE

## 2021-06-04 DIAGNOSIS — Z13.9 ENCOUNTER FOR SCREENING: ICD-10-CM

## 2021-07-06 PROBLEM — L98.9 SCALP LESION: Status: ACTIVE | Noted: 2021-05-05

## 2021-08-16 RX ORDER — LEVOTHYROXINE SODIUM 0.12 MG/1
TABLET ORAL
Qty: 90 TABLET | Refills: 1 | Status: SHIPPED | OUTPATIENT
Start: 2021-08-16 | End: 2021-08-16

## 2021-09-24 ENCOUNTER — OFFICE VISIT (OUTPATIENT)
Dept: INTEGRATIVE MEDICINE | Facility: CLINIC | Age: 56
End: 2021-09-24
Payer: COMMERCIAL

## 2021-09-24 VITALS
WEIGHT: 237.38 LBS | HEART RATE: 70 BPM | HEIGHT: 65 IN | DIASTOLIC BLOOD PRESSURE: 70 MMHG | BODY MASS INDEX: 39.55 KG/M2 | SYSTOLIC BLOOD PRESSURE: 118 MMHG | OXYGEN SATURATION: 98 %

## 2021-09-24 DIAGNOSIS — Z71.89 ENCOUNTER FOR HERB AND VITAMIN SUPPLEMENT MANAGEMENT: ICD-10-CM

## 2021-09-24 DIAGNOSIS — T78.1XXA FOOD SENSITIVITY WITH GASTROINTESTINAL SYMPTOMS: ICD-10-CM

## 2021-09-24 DIAGNOSIS — E03.9 HYPOTHYROIDISM, UNSPECIFIED TYPE: ICD-10-CM

## 2021-09-24 DIAGNOSIS — E06.3 HASHIMOTO'S THYROIDITIS: ICD-10-CM

## 2021-09-24 DIAGNOSIS — R53.82 CHRONIC FATIGUE: ICD-10-CM

## 2021-09-24 DIAGNOSIS — F41.9 ANXIETY: ICD-10-CM

## 2021-09-24 DIAGNOSIS — L65.9 HAIR LOSS: ICD-10-CM

## 2021-09-24 DIAGNOSIS — B37.9 CANDIDIASIS: ICD-10-CM

## 2021-09-24 DIAGNOSIS — F51.01 PRIMARY INSOMNIA: ICD-10-CM

## 2021-09-24 DIAGNOSIS — E66.9 OBESITY (BMI 30-39.9): Primary | ICD-10-CM

## 2021-09-24 PROBLEM — L98.9 SCALP LESION: Status: RESOLVED | Noted: 2021-05-05 | Resolved: 2021-09-24

## 2021-09-24 PROBLEM — C44.49: Status: ACTIVE | Noted: 2021-06-17

## 2021-09-24 PROBLEM — C44.49: Status: RESOLVED | Noted: 2021-06-17 | Resolved: 2021-09-24

## 2021-09-24 PROCEDURE — 3074F SYST BP LT 130 MM HG: CPT | Performed by: FAMILY MEDICINE

## 2021-09-24 PROCEDURE — 3078F DIAST BP <80 MM HG: CPT | Performed by: FAMILY MEDICINE

## 2021-09-24 PROCEDURE — 3008F BODY MASS INDEX DOCD: CPT | Performed by: FAMILY MEDICINE

## 2021-09-24 PROCEDURE — 99214 OFFICE O/P EST MOD 30 MIN: CPT | Performed by: FAMILY MEDICINE

## 2021-09-24 NOTE — PROGRESS NOTES
Carola Oliva is a 49year old female. Patient presents with: Integrative Medicine Consult: 4 mo f/u      HPI:     Had surgeries to remove DFSP in July 2021 at [facility]. Healing well. Type of cancer downgraded, low risk of recurrence.     Getting rush • Cholecalciferol (VITAMIN D) 125 MCG (5000 UT) Oral Cap Take 5,000 Units by mouth daily. • Caprylic Acid Does not apply Liquid Take 600 mg by mouth daily. • ASHWAGANDHA OR Take 350 mg by mouth daily.      • NON FORMULARY Grapefruit seed extract, Session: Not on file  Stress:       Feeling of Stress : Not on file  Social Connections:       Frequency of Communication with Friends and Family: Not on file      Frequency of Social Gatherings with Friends and Family: Not on file      Attends Sikh S A1C  - VITAMIN D, 25-HYDROXY    2. Hypothyroidism, unspecified type    3. Hashimoto's thyroiditis    4. Food sensitivity with gastrointestinal symptoms    5. Hair loss  - OZIEL IGG,IGM,IGA    6. Candidiasis  - CANDIDA IGG,IGM,IGA    7.  Chronic fatigue or black tea instead coffee. You can try Chicory Root tea and/or Yerba Mate if you desire more of the coffee-like flavor.      [employer]    Directions: Use instead of coffee  Where to Purchase: [address] Un[address] instead of beer or other alcoh

## 2021-09-24 NOTE — PATIENT INSTRUCTIONS
I have complete cindy in the body's ability to heal and transform. The products and items listed below (the “Products”)  and their claims have not been evaluated by the Food and Drug Administration.  Dietary products are not intended to treat, prevent, m

## 2021-10-10 ENCOUNTER — PATIENT MESSAGE (OUTPATIENT)
Dept: ENDOCRINOLOGY CLINIC | Facility: CLINIC | Age: 56
End: 2021-10-10

## 2021-10-10 DIAGNOSIS — E04.1 THYROID NODULE: Primary | ICD-10-CM

## 2021-10-11 NOTE — TELEPHONE ENCOUNTER
From: Hemanth Domínguez  To: Annita Allen MD  Sent: 10/10/2021 3:58 PM CDT  Subject: Testing prior to scheduled visit    Fiona Hastings,  I have just scheduled a follow-up visit for December 21, 2021 (soonest available).  I have met my insurance de

## 2021-10-15 ENCOUNTER — OFFICE VISIT (OUTPATIENT)
Dept: INTERNAL MEDICINE CLINIC | Facility: CLINIC | Age: 56
End: 2021-10-15
Payer: COMMERCIAL

## 2021-10-15 VITALS
HEART RATE: 74 BPM | WEIGHT: 235 LBS | DIASTOLIC BLOOD PRESSURE: 81 MMHG | SYSTOLIC BLOOD PRESSURE: 123 MMHG | BODY MASS INDEX: 39.15 KG/M2 | HEIGHT: 65 IN

## 2021-10-15 DIAGNOSIS — C44.99 DERMATOFIBROSARCOMA PROTUBERANS: ICD-10-CM

## 2021-10-15 DIAGNOSIS — E78.5 HYPERLIPIDEMIA, UNSPECIFIED HYPERLIPIDEMIA TYPE: ICD-10-CM

## 2021-10-15 DIAGNOSIS — Z00.00 ANNUAL PHYSICAL EXAM: Primary | ICD-10-CM

## 2021-10-15 DIAGNOSIS — Z23 NEED FOR VACCINATION: ICD-10-CM

## 2021-10-15 DIAGNOSIS — Z12.11 COLON CANCER SCREENING: ICD-10-CM

## 2021-10-15 DIAGNOSIS — E06.3 HASHIMOTO'S THYROIDITIS: ICD-10-CM

## 2021-10-15 PROCEDURE — 3079F DIAST BP 80-89 MM HG: CPT | Performed by: INTERNAL MEDICINE

## 2021-10-15 PROCEDURE — 3074F SYST BP LT 130 MM HG: CPT | Performed by: INTERNAL MEDICINE

## 2021-10-15 PROCEDURE — 3008F BODY MASS INDEX DOCD: CPT | Performed by: INTERNAL MEDICINE

## 2021-10-15 PROCEDURE — 90686 IIV4 VACC NO PRSV 0.5 ML IM: CPT | Performed by: INTERNAL MEDICINE

## 2021-10-15 PROCEDURE — 90732 PPSV23 VACC 2 YRS+ SUBQ/IM: CPT | Performed by: INTERNAL MEDICINE

## 2021-10-15 PROCEDURE — 90471 IMMUNIZATION ADMIN: CPT | Performed by: INTERNAL MEDICINE

## 2021-10-15 PROCEDURE — 99396 PREV VISIT EST AGE 40-64: CPT | Performed by: INTERNAL MEDICINE

## 2021-10-15 PROCEDURE — 90472 IMMUNIZATION ADMIN EACH ADD: CPT | Performed by: INTERNAL MEDICINE

## 2021-10-15 NOTE — PATIENT INSTRUCTIONS
Prevention Guidelines, Women Ages 48 to 59  Screening tests and vaccines are an important part of managing your health. A screening test is done to find possible disorders or diseases in people who don't have any symptoms.  The goal is to find a disease ear Colorectal cancer All women at average risk in this age group Multiple tests are available and are used at different times.  Possible tests include:  · Flexible sigmoidoscopy every 5 years, or  · Colonoscopy every 10 years, or  · CT colonography (virtual should be given at least 4 weeks after the first dose   Hepatitis A Women at increased risk for infection – talk with your healthcare provider 2 doses given at least 6 months apart   Hepatitis B Women at increased risk for infection – talk with your health with your healthcare provider At routine exams   Use of daily aspirin Women ages 54 and up in this age group who are at risk for cardiovascular health problems such as stroke When your risk is known   Use of tobacco and the health effects it can cause All

## 2021-10-16 NOTE — PROGRESS NOTES
Patient ID: Mika Lu is a 49year old female. Patient presents with:  Physical: flu vaccine. \"I would like for him to look at my surgery scars\"  on head       HISTORY OF PRESENT ILLNESS:   HPI  Patient presents for above.   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 Eyes:      General: No scleral icterus. Cardiovascular:      Rate and Rhythm: Normal rate and regular rhythm. Pulses: Normal pulses. Heart sounds: Normal heart sounds. No murmur heard.       Pulmonary:      Effort: Pulmonary e

## 2021-11-24 ENCOUNTER — LAB ENCOUNTER (OUTPATIENT)
Dept: LAB | Facility: REFERENCE LAB | Age: 56
End: 2021-11-24
Attending: FAMILY MEDICINE
Payer: COMMERCIAL

## 2021-11-24 DIAGNOSIS — E78.5 HYPERLIPIDEMIA, UNSPECIFIED HYPERLIPIDEMIA TYPE: ICD-10-CM

## 2021-11-24 DIAGNOSIS — Z00.00 ANNUAL PHYSICAL EXAM: ICD-10-CM

## 2021-11-24 DIAGNOSIS — L65.9 HAIR LOSS: Primary | ICD-10-CM

## 2021-11-24 DIAGNOSIS — R53.82 CHRONIC FATIGUE: ICD-10-CM

## 2021-11-24 DIAGNOSIS — F51.01 PRIMARY INSOMNIA: ICD-10-CM

## 2021-11-24 PROCEDURE — 80061 LIPID PANEL: CPT

## 2021-11-24 PROCEDURE — 80053 COMPREHEN METABOLIC PANEL: CPT

## 2021-11-24 PROCEDURE — 83036 HEMOGLOBIN GLYCOSYLATED A1C: CPT | Performed by: FAMILY MEDICINE

## 2021-11-24 PROCEDURE — 86628 CANDIDA ANTIBODY: CPT

## 2021-11-24 PROCEDURE — 82306 VITAMIN D 25 HYDROXY: CPT | Performed by: FAMILY MEDICINE

## 2021-11-24 PROCEDURE — 36415 COLL VENOUS BLD VENIPUNCTURE: CPT

## 2021-11-24 PROCEDURE — 84443 ASSAY THYROID STIM HORMONE: CPT | Performed by: INTERNAL MEDICINE

## 2021-11-24 PROCEDURE — 84140 ASSAY OF PREGNENOLONE: CPT

## 2021-11-24 PROCEDURE — 85025 COMPLETE CBC W/AUTO DIFF WBC: CPT

## 2021-12-02 ENCOUNTER — HOSPITAL ENCOUNTER (OUTPATIENT)
Dept: ULTRASOUND IMAGING | Age: 56
Discharge: HOME | End: 2021-12-02
Attending: INTERNAL MEDICINE
Payer: COMMERCIAL

## 2021-12-02 ENCOUNTER — HOSPITAL ENCOUNTER (OUTPATIENT)
Dept: MAMMOGRAPHY | Facility: HOSPITAL | Age: 56
Discharge: HOME | End: 2021-12-02
Attending: INTERNAL MEDICINE
Payer: COMMERCIAL

## 2021-12-02 DIAGNOSIS — E04.1 THYROID NODULE: ICD-10-CM

## 2021-12-02 DIAGNOSIS — Z00.00 ANNUAL PHYSICAL EXAM: ICD-10-CM

## 2021-12-02 PROCEDURE — 77067 SCR MAMMO BI INCL CAD: CPT | Performed by: INTERNAL MEDICINE

## 2021-12-02 PROCEDURE — 76536 US EXAM OF HEAD AND NECK: CPT | Performed by: INTERNAL MEDICINE

## 2021-12-02 PROCEDURE — 77063 BREAST TOMOSYNTHESIS BI: CPT | Performed by: INTERNAL MEDICINE

## 2021-12-03 ENCOUNTER — TELEPHONE (OUTPATIENT)
Dept: DERMATOLOGY CLINIC | Facility: CLINIC | Age: 56
End: 2021-12-03

## 2021-12-03 ENCOUNTER — MED REC SCAN ONLY (OUTPATIENT)
Dept: DERMATOLOGY CLINIC | Facility: CLINIC | Age: 56
End: 2021-12-03

## 2021-12-03 NOTE — TELEPHONE ENCOUNTER
Noted.  Pt should have f/u either here or with another Dermatology office for routine skin exams every 3-4 months as remoccomended by her Oncologist. No appointments scheduled visible in Epic. PCP alerted.

## 2021-12-19 ENCOUNTER — PATIENT MESSAGE (OUTPATIENT)
Dept: ENDOCRINOLOGY CLINIC | Facility: CLINIC | Age: 56
End: 2021-12-19

## 2021-12-20 NOTE — TELEPHONE ENCOUNTER
From: Doreen Euceda  Sent: 12/19/2021 9:14 PM CST  To: Urvashi Zhong Clinical Staff  Subject: appt re-schedule    That will work fine for me. Thank you.

## 2021-12-21 ENCOUNTER — PATIENT MESSAGE (OUTPATIENT)
Dept: ENDOCRINOLOGY CLINIC | Facility: CLINIC | Age: 56
End: 2021-12-21

## 2021-12-21 ENCOUNTER — HOSPITAL ENCOUNTER (OUTPATIENT)
Age: 56
Discharge: HOME | End: 2021-12-21
Attending: EMERGENCY MEDICINE
Payer: COMMERCIAL

## 2021-12-21 VITALS
WEIGHT: 235 LBS | TEMPERATURE: 98 F | RESPIRATION RATE: 20 BRPM | OXYGEN SATURATION: 97 % | BODY MASS INDEX: 39.15 KG/M2 | DIASTOLIC BLOOD PRESSURE: 82 MMHG | HEIGHT: 65 IN | SYSTOLIC BLOOD PRESSURE: 146 MMHG | HEART RATE: 70 BPM

## 2021-12-21 DIAGNOSIS — T50.905A ADVERSE EFFECT OF DRUG, INITIAL ENCOUNTER: ICD-10-CM

## 2021-12-21 DIAGNOSIS — U07.1 COVID-19: Primary | ICD-10-CM

## 2021-12-21 PROCEDURE — 99204 OFFICE O/P NEW MOD 45 MIN: CPT

## 2021-12-21 PROCEDURE — 99214 OFFICE O/P EST MOD 30 MIN: CPT

## 2021-12-21 NOTE — ED QUICK NOTES
Patient became very red in face, lightheaded, and nauseated after 8 ml infused. No itching. No SOB. Infusion stopped. Dr. Isaac Fleming at bedside.

## 2021-12-21 NOTE — ED QUICK NOTES
Patient feeling much better. Drinking water. Does not want to continue with the infusion. IV removed intact. Patient ready for discharge.

## 2021-12-21 NOTE — ED PROVIDER NOTES
Patient Seen in: [facility]      History   Patient presents with:  Sinus Problem    Stated Complaint: Sinus cough exposed Covid home test COVID= +    Subjective:   HPI    Patient is a 53-year-old female with past history of Hashimoto's diseas Current:/82   Pulse 70   Temp 98.2 °F (36.8 °C) (Temporal)   Resp 20   Ht 165.1 cm (5' 5\")   Wt 106.6 kg   LMP 09/24/2021 (Approximate)   SpO2 97%   BMI 39.11 kg/m²         Physical Exam    Constitutional: Well-developed well-nourished in no a monoclonal antibody infusion. They have had mild symptoms for 2 days. They do not require oxygen or hospitalization and have the following risks factors:  .     The patient/caregiver has been given the “Fact Sheet for Patients, Parents and Caregivers”, info

## 2021-12-21 NOTE — ED INITIAL ASSESSMENT (HPI)
+ for covid. Patient with nasal congestion, fatigue, and sore throat for 2 days. Hx of covid 1 year ago. Vaccinated plus booster. No fever.

## 2021-12-21 NOTE — TELEPHONE ENCOUNTER
From: Jona Damico  To:  Yanet Vega MD  Sent: 12/19/2021 11:49 AM CST  Subject: appt re-schedule    Dear Dr. Bethany Delarosa,  My  tested positive for Covid yesterday so we are in isolation and I will be unable to make my appointment on Tuesday

## 2021-12-23 ENCOUNTER — TELEPHONE (OUTPATIENT)
Dept: CASE MANAGEMENT | Age: 56
End: 2021-12-23

## 2021-12-23 NOTE — TELEPHONE ENCOUNTER
Pt stated she only received 8cc's  of MAB, begin to have a reaction, iv stopped, stated she's ok now , treating her s/s, O2 sat 98%, sob with exertion,cough syrup robitussin , some fatigue, stated this is her second time, had neg home test and positive rap

## 2021-12-23 NOTE — TELEPHONE ENCOUNTER
Pt received PAB infusion at [address] [place] [address] on 12/21 for COVID-19. Please follow-up with pt for post-infusion assessment and home monitoring if needed. Thank you.

## 2022-01-02 ENCOUNTER — PATIENT MESSAGE (OUTPATIENT)
Dept: ENDOCRINOLOGY CLINIC | Facility: CLINIC | Age: 57
End: 2022-01-02

## 2022-01-03 ENCOUNTER — TELEMEDICINE (OUTPATIENT)
Dept: ENDOCRINOLOGY CLINIC | Facility: CLINIC | Age: 57
End: 2022-01-03
Payer: COMMERCIAL

## 2022-01-03 DIAGNOSIS — E55.9 VITAMIN D INSUFFICIENCY: Primary | ICD-10-CM

## 2022-01-03 DIAGNOSIS — E03.9 HYPOTHYROIDISM, UNSPECIFIED TYPE: ICD-10-CM

## 2022-01-03 DIAGNOSIS — E04.1 THYROID NODULE: ICD-10-CM

## 2022-01-03 PROCEDURE — 99213 OFFICE O/P EST LOW 20 MIN: CPT | Performed by: INTERNAL MEDICINE

## 2022-01-03 NOTE — PROGRESS NOTES
Donna Graham verbally consents to a video visit on 1/3/2022     Patient understands and accepts financial responsibility for any deductible, co-insurance and/or co-pays associated with this service. Patient has been referred to the [facility] website at www. (DAILY MULTIPLE VITAMIN/IRON OR), Take by mouth., Disp: , Rfl:   Omega-3 Fatty Acids (FISH OIL OR), Take by mouth., Disp: , Rfl:   B Complex Vitamins (B COMPLEX OR), Take by mouth., Disp: , Rfl:   Magnesium 100 MG Oral Tab, Take by mouth., Disp: , Rfl:   D polydypsia  Skin: Negative for: rash, blister, cellulitis,        PHYSICAL EXAM:         General Appearance:  alert, well developed, in no acute distress  Head: Atraumatic  Eyes:  normal conjunctivae, sclera. , normal sclera and normal pupils  Throat/Neck: cpm and recheck labs in three months    Patient verbalized a complete  understanding of all of the above and did not have any further questions.            Linda Dent MD        Please note that the following visit was completed using two-way, real-tootie

## 2022-02-15 ENCOUNTER — OFFICE VISIT (OUTPATIENT)
Dept: OBGYN CLINIC | Facility: CLINIC | Age: 57
End: 2022-02-15
Payer: COMMERCIAL

## 2022-02-15 VITALS
BODY MASS INDEX: 39 KG/M2 | SYSTOLIC BLOOD PRESSURE: 146 MMHG | DIASTOLIC BLOOD PRESSURE: 86 MMHG | HEART RATE: 77 BPM | WEIGHT: 232.38 LBS

## 2022-02-15 DIAGNOSIS — R19.00 PELVIC MASS: ICD-10-CM

## 2022-02-15 DIAGNOSIS — Z12.31 ENCOUNTER FOR SCREENING MAMMOGRAM FOR BREAST CANCER: ICD-10-CM

## 2022-02-15 DIAGNOSIS — Z01.419 ENCOUNTER FOR GYNECOLOGICAL EXAMINATION: Primary | ICD-10-CM

## 2022-02-15 PROCEDURE — 3077F SYST BP >= 140 MM HG: CPT | Performed by: OBSTETRICS & GYNECOLOGY

## 2022-02-15 PROCEDURE — 99386 PREV VISIT NEW AGE 40-64: CPT | Performed by: OBSTETRICS & GYNECOLOGY

## 2022-02-15 PROCEDURE — 3079F DIAST BP 80-89 MM HG: CPT | Performed by: OBSTETRICS & GYNECOLOGY

## 2022-02-16 LAB — HPV I/H RISK 1 DNA SPEC QL NAA+PROBE: NEGATIVE

## 2022-03-02 ENCOUNTER — HOSPITAL ENCOUNTER (OUTPATIENT)
Dept: ULTRASOUND IMAGING | Facility: HOSPITAL | Age: 57
Discharge: HOME | End: 2022-03-02
Attending: OBSTETRICS & GYNECOLOGY
Payer: COMMERCIAL

## 2022-03-02 DIAGNOSIS — R19.00 PELVIC MASS: ICD-10-CM

## 2022-03-02 PROCEDURE — 76856 US EXAM PELVIC COMPLETE: CPT | Performed by: OBSTETRICS & GYNECOLOGY

## 2022-03-02 PROCEDURE — 76830 TRANSVAGINAL US NON-OB: CPT | Performed by: OBSTETRICS & GYNECOLOGY

## 2022-03-03 ENCOUNTER — PATIENT MESSAGE (OUTPATIENT)
Dept: OBGYN CLINIC | Facility: CLINIC | Age: 57
End: 2022-03-03

## 2022-03-03 RX ORDER — LEVOTHYROXINE SODIUM 0.12 MG/1
125 TABLET ORAL DAILY
Qty: 90 TABLET | Refills: 1 | Status: SHIPPED | OUTPATIENT
Start: 2022-03-03

## 2022-03-03 NOTE — TELEPHONE ENCOUNTER
Refill passed per [facility] protocol. Requested Prescriptions   Pending Prescriptions Disp Refills    LEVOTHYROXINE 125 MCG Oral Tab [Pharmacy Med Name: LEVOTHYROXINE 0.125MG (125MCG) TAB] 90 tablet 1     Sig: TAKE 1 TABLET(125 MCG) BY MOUTH DAILY        Thyroid Medication Protocol Passed - 3/3/2022  9:07 AM        Passed - TSH in past 12 months        Passed - Last TSH value is normal     Lab Results   Component Value Date    TSH 2.320 11/24/2021                 Passed - Appointment in past 12 or next 3 months               Recent Outpatient Visits              2 weeks ago Encounter for gynecological examination    [facility] for Juanita Farmer MD    Office Visit    1 month ago Vitamin D insufficiency    [facility] Endocrinology Anusha Lewis MD    Telemedicine    4 months ago Annual physical exam    Lien Taylor MD    Office Visit    5 months ago Obesity (BMI 30-39. 9)    [address] [place] [address]    Office Visit    9 months ago Dermatofibrosarcoma protuberans    [address] [place] [address]    Office Visit            Future Appointments         Provider Department Appt Notes    In 3 weeks [facility] [address] [place] [address], [facility] Return in about 6 months (around 3/24/2022) for Integrative Medicine - Established (30 min).

## 2022-03-03 NOTE — TELEPHONE ENCOUNTER
From: Lexii Carias  To: Soraida Graham MD  Sent: 3/3/2022 9:04 AM CST  Subject: Ultrasound follow-up    I realize that Dr Willie Devi is very busy but at my last visit, surgery was discussed based on the results on the ultra-sound. I would like an in-person visit to discuss Dr Zahra Barone recommendation and the options within her recommendation rather than a phone visit if surgery is part of her recommendation.   Thank you, Velia Mann

## 2022-03-10 ENCOUNTER — OFFICE VISIT (OUTPATIENT)
Dept: OBGYN CLINIC | Facility: CLINIC | Age: 57
End: 2022-03-10
Payer: COMMERCIAL

## 2022-03-10 VITALS
SYSTOLIC BLOOD PRESSURE: 131 MMHG | BODY MASS INDEX: 39 KG/M2 | DIASTOLIC BLOOD PRESSURE: 86 MMHG | WEIGHT: 232.19 LBS | HEART RATE: 76 BPM

## 2022-03-10 DIAGNOSIS — D21.9 FIBROID: Primary | ICD-10-CM

## 2022-03-10 PROCEDURE — 99212 OFFICE O/P EST SF 10 MIN: CPT | Performed by: OBSTETRICS & GYNECOLOGY

## 2022-03-10 PROCEDURE — 3075F SYST BP GE 130 - 139MM HG: CPT | Performed by: OBSTETRICS & GYNECOLOGY

## 2022-03-10 PROCEDURE — 3079F DIAST BP 80-89 MM HG: CPT | Performed by: OBSTETRICS & GYNECOLOGY

## 2022-03-16 ENCOUNTER — OFFICE VISIT (OUTPATIENT)
Dept: INTEGRATIVE MEDICINE | Facility: CLINIC | Age: 57
End: 2022-03-16
Payer: COMMERCIAL

## 2022-03-16 VITALS
BODY MASS INDEX: 39.09 KG/M2 | WEIGHT: 234.63 LBS | HEART RATE: 90 BPM | HEIGHT: 65 IN | OXYGEN SATURATION: 98 % | SYSTOLIC BLOOD PRESSURE: 118 MMHG | DIASTOLIC BLOOD PRESSURE: 74 MMHG

## 2022-03-16 DIAGNOSIS — F41.9 ANXIETY: ICD-10-CM

## 2022-03-16 DIAGNOSIS — D25.9 UTERINE LEIOMYOMA, UNSPECIFIED LOCATION: ICD-10-CM

## 2022-03-16 DIAGNOSIS — E66.9 OBESITY (BMI 30-39.9): ICD-10-CM

## 2022-03-16 DIAGNOSIS — L65.9 HAIR LOSS: ICD-10-CM

## 2022-03-16 DIAGNOSIS — T78.1XXA FOOD SENSITIVITY WITH GASTROINTESTINAL SYMPTOMS: ICD-10-CM

## 2022-03-16 DIAGNOSIS — B37.9 CANDIDIASIS: ICD-10-CM

## 2022-03-16 DIAGNOSIS — E78.1 HYPERTRIGLYCERIDEMIA: Primary | ICD-10-CM

## 2022-03-16 DIAGNOSIS — Z71.89 ENCOUNTER FOR HERB AND VITAMIN SUPPLEMENT MANAGEMENT: ICD-10-CM

## 2022-03-16 PROCEDURE — 3008F BODY MASS INDEX DOCD: CPT | Performed by: FAMILY MEDICINE

## 2022-03-16 PROCEDURE — 3078F DIAST BP <80 MM HG: CPT | Performed by: FAMILY MEDICINE

## 2022-03-16 PROCEDURE — 99214 OFFICE O/P EST MOD 30 MIN: CPT | Performed by: FAMILY MEDICINE

## 2022-03-16 PROCEDURE — 3074F SYST BP LT 130 MM HG: CPT | Performed by: FAMILY MEDICINE

## 2022-03-17 ENCOUNTER — TELEPHONE (OUTPATIENT)
Dept: INTERNAL MEDICINE CLINIC | Facility: CLINIC | Age: 57
End: 2022-03-17

## 2022-03-17 DIAGNOSIS — Z12.11 COLON CANCER SCREENING: Primary | ICD-10-CM

## 2022-03-18 ENCOUNTER — PATIENT MESSAGE (OUTPATIENT)
Dept: OBGYN CLINIC | Facility: CLINIC | Age: 57
End: 2022-03-18

## 2022-03-18 NOTE — TELEPHONE ENCOUNTER
To MARIA DEL ROSARIOK to please review pts Ex[employer] message, has appt with Dr. Bertha Burnett on 3/22 for second opinion.

## 2022-03-18 NOTE — TELEPHONE ENCOUNTER
From: Katya Sylvester  To: Trip Quiroz MD  Sent: 3/18/2022 10:16 AM CDT  Subject: fibroid removal consult    Dr Cheyanne Cooley, thank you for your time last week. I wanted to give you an update. I had a follow-up appt with my DO to discuss overall health. She agreed with you, that due to my concerns about surgery and anesthesia, having a second opinion might give me more information and help ease my anxiety. I have that appointment on Tuesday, March 22. I have also requested my records from [facility] for my previous surgeries to see what medications I was given in surgery and recovery to hopefully find out what made me so sick. I would like to update and re-connect with you after March 22 to form the best plan. Thank you again for your kindness and expertise.

## 2022-03-22 ENCOUNTER — OFFICE VISIT (OUTPATIENT)
Dept: OBGYN CLINIC | Facility: CLINIC | Age: 57
End: 2022-03-22
Payer: COMMERCIAL

## 2022-03-22 VITALS
HEIGHT: 65 IN | WEIGHT: 238 LBS | SYSTOLIC BLOOD PRESSURE: 122 MMHG | BODY MASS INDEX: 39.65 KG/M2 | DIASTOLIC BLOOD PRESSURE: 78 MMHG

## 2022-03-22 DIAGNOSIS — D21.9 FIBROIDS: Primary | ICD-10-CM

## 2022-03-22 DIAGNOSIS — Z92.89: ICD-10-CM

## 2022-03-22 PROCEDURE — 3074F SYST BP LT 130 MM HG: CPT | Performed by: OBSTETRICS & GYNECOLOGY

## 2022-03-22 PROCEDURE — 3008F BODY MASS INDEX DOCD: CPT | Performed by: OBSTETRICS & GYNECOLOGY

## 2022-03-22 PROCEDURE — 99205 OFFICE O/P NEW HI 60 MIN: CPT | Performed by: OBSTETRICS & GYNECOLOGY

## 2022-03-22 PROCEDURE — 3078F DIAST BP <80 MM HG: CPT | Performed by: OBSTETRICS & GYNECOLOGY

## 2022-03-24 NOTE — TELEPHONE ENCOUNTER
Patient called to schedule this 20 minute consultation appointment. Soonest opening is early August.  Dr Lizeth More recommended fibroid be removed as soon as her surgical schedule would allow which is sometime in May according to patient. Please advise if she can be seen sooner for a follow up consultation and to have all of her questions answered.

## 2022-03-24 NOTE — TELEPHONE ENCOUNTER
Pt accepted an appt on Monday  3/28 at 420pm with CON[facility] at [facility]. Routed to [place] to please open that slot. There is no one here to open. Pt already confirmed this appt and does not need a call.

## 2022-03-24 NOTE — TELEPHONE ENCOUNTER
Pt saw Giuliano Howell [phone] 3/10/22 and discussed surgery. Pt has more questions. Recommended she make an appt but per phone room next available is early august. Is there somewhere we can add this pt sooner or would you prefer to address her additional questions through mychart? Thanks.

## 2022-03-28 ENCOUNTER — OFFICE VISIT (OUTPATIENT)
Dept: OBGYN CLINIC | Facility: CLINIC | Age: 57
End: 2022-03-28
Payer: COMMERCIAL

## 2022-03-28 VITALS — HEART RATE: 82 BPM | DIASTOLIC BLOOD PRESSURE: 87 MMHG | SYSTOLIC BLOOD PRESSURE: 138 MMHG

## 2022-03-28 DIAGNOSIS — D21.9 FIBROID: Primary | ICD-10-CM

## 2022-03-28 PROCEDURE — 99212 OFFICE O/P EST SF 10 MIN: CPT | Performed by: OBSTETRICS & GYNECOLOGY

## 2022-03-28 PROCEDURE — 3079F DIAST BP 80-89 MM HG: CPT | Performed by: OBSTETRICS & GYNECOLOGY

## 2022-03-28 PROCEDURE — 3075F SYST BP GE 130 - 139MM HG: CPT | Performed by: OBSTETRICS & GYNECOLOGY

## 2022-03-29 ENCOUNTER — TELEPHONE (OUTPATIENT)
Dept: OBGYN CLINIC | Facility: CLINIC | Age: 57
End: 2022-03-29

## 2022-03-29 NOTE — TELEPHONE ENCOUNTER
Spoke to pt.  Dino surgery is scheduled on Monday,06/02/2022 at 7:30am    Will route message to CAP to see if she is able to assist    PA initiated via [employer]      Major case and antimicrobial wash instructions sent via [employer]    Delayed staff message sent to MD to please place pre-op orders    Entered in book and calender

## 2022-03-29 NOTE — TELEPHONE ENCOUNTER
OB GYN SURGICAL SCHEDULING    Assessment: fibroid    Pre-Operative Procedure:   Total abdominal hysterectomy and bilateral salpingo oophoretomy    Admission:  AM Admit    Anesthesia: General    Additional Orders:  Routine Orders    Comments / Orders to Nurse:     Discussed possible complications including but not limited to:  anesthesia risks, bleeding, infection, injury, bowel / bladder, injury, urethral and postop DVT / PE

## 2022-04-11 ENCOUNTER — PATIENT MESSAGE (OUTPATIENT)
Dept: OBGYN CLINIC | Facility: CLINIC | Age: 57
End: 2022-04-11

## 2022-04-11 NOTE — TELEPHONE ENCOUNTER
From: Andria Smith  To: Lucy Bates MD  Sent: 4/11/2022 11:03 AM CDT  Subject: [facility] consult request    Fiona, I called Dr Rodolfo Ramirez office for a pre-surgery anesthesiology consult as suggested by Dr. Eboni Leos. I was told that your office needed to call ([phone]) and request Dr Talat Smith's (head of anesthesiology) contact information and then call her directly to make an appointment for me. They do not give out her contact information to patients, she makes the appointments herself, and she is the only doctor who conducts pre-surgery consultations. I apologize for the inconvenience. I was first told by Dr Rodolfo Ramirez nurse that Dr. Mabel Billy office would contact me but when I called to follow-up today, another nurse, [facility] gave this explanation. My surgery date is June 6 and I would like the soonest appointment that she has available. Please let me know what I can do to facilitate this process.  Thank you, Radha House

## 2022-04-11 NOTE — TELEPHONE ENCOUNTER
MARIA DEL ROSARIOK- please see patient message and advise. Is there another provider you would recommend? Otherwise will call for Dr. Jose Vasquez contact info as per patient.

## 2022-04-13 NOTE — TELEPHONE ENCOUNTER
Attempted to reach [facility], but office is closed. No option to leave message. Will try again tomorrow. Patient notified that Giuliano Howell [phone] left message with Dr. Brian Goldman.

## 2022-05-10 ENCOUNTER — ORDER TRANSCRIPTION (OUTPATIENT)
Dept: ADMINISTRATIVE | Facility: HOSPITAL | Age: 57
End: 2022-05-10

## 2022-05-11 ENCOUNTER — TELEPHONE (OUTPATIENT)
Dept: INTERNAL MEDICINE CLINIC | Facility: CLINIC | Age: 57
End: 2022-05-11

## 2022-05-11 NOTE — TELEPHONE ENCOUNTER
Pt states that she is scheduled to have surgery on 6-6-22. Pt had an appt scheduled with Dr. An Carrasco for tomorrow, but, it is cancelled due to doctors schedule. The first available with Dr. An Carrasco is not until 5-18-22. Pt would like to know if she can see someone else to get her in sooner/this week. Pt does not want to wait until 5-18-22. Pt did no schedule an appt at this time.

## 2022-05-13 ENCOUNTER — TELEPHONE (OUTPATIENT)
Dept: INTEGRATIVE MEDICINE | Facility: CLINIC | Age: 57
End: 2022-05-13

## 2022-05-13 ENCOUNTER — PATIENT MESSAGE (OUTPATIENT)
Dept: INTERNAL MEDICINE CLINIC | Facility: CLINIC | Age: 57
End: 2022-05-13

## 2022-05-13 ENCOUNTER — HOSPITAL ENCOUNTER (OUTPATIENT)
Dept: ULTRASOUND IMAGING | Age: 57
Discharge: HOME | End: 2022-05-13
Attending: INTERNAL MEDICINE

## 2022-05-13 DIAGNOSIS — Z13.6 SCREENING FOR CARDIOVASCULAR CONDITION: ICD-10-CM

## 2022-05-13 NOTE — TELEPHONE ENCOUNTER
Received vm from pt, questions re lipid panel order. Wants to know if she can walk into lab or if new order needs to be placed. If possible, fast 8 hours prior. Can still drink water, tea, or black coffee while fasting, no added cream or sugar. Lipid placed 3/2022 - order good x 1 year.

## 2022-05-13 NOTE — TELEPHONE ENCOUNTER
Spoke with patient - informed her that her lab is still good. Fast 10-12 hours prior to blood draw. Only water, black coffee or tea - do no add any creams or sugars. Pt verbalized understanding.

## 2022-05-13 NOTE — TELEPHONE ENCOUNTER
Future Appointments   Date Time Provider Maru Tobar   5/16/2022  2:40 PM Denise Stanley MD [facility]   5/19/2022  1:30 PM Mendez Andrade [facility] INT [facility]   5/27/2022  2:30 [facility]   7/12/2022  2:00 PM Claritza Gonzalez DO [facility] INT [facility]   8/4/2022 12:15 PM Gio Jamison MD [facility] [place]

## 2022-05-16 ENCOUNTER — OFFICE VISIT (OUTPATIENT)
Dept: INTERNAL MEDICINE CLINIC | Facility: CLINIC | Age: 57
End: 2022-05-16
Payer: COMMERCIAL

## 2022-05-16 VITALS
HEART RATE: 75 BPM | WEIGHT: 237 LBS | DIASTOLIC BLOOD PRESSURE: 76 MMHG | SYSTOLIC BLOOD PRESSURE: 132 MMHG | HEIGHT: 65 IN | BODY MASS INDEX: 39.49 KG/M2

## 2022-05-16 DIAGNOSIS — Z01.818 PREOPERATIVE EXAMINATION: Primary | ICD-10-CM

## 2022-05-18 ENCOUNTER — LAB ENCOUNTER (OUTPATIENT)
Dept: LAB | Age: 57
End: 2022-05-18
Attending: FAMILY MEDICINE
Payer: COMMERCIAL

## 2022-05-18 DIAGNOSIS — E55.9 VITAMIN D INSUFFICIENCY: ICD-10-CM

## 2022-05-18 DIAGNOSIS — E55.9 VITAMIN D DEFICIENCY: Primary | ICD-10-CM

## 2022-05-18 DIAGNOSIS — E78.1 HYPERTRIGLYCERIDEMIA: ICD-10-CM

## 2022-05-18 LAB
CHOLEST SERPL-MCNC: 184 MG/DL (ref ?–200)
FASTING PATIENT LIPID ANSWER: YES
HDLC SERPL-MCNC: 63 MG/DL (ref 40–59)
LDLC SERPL CALC-MCNC: 99 MG/DL (ref ?–100)
NONHDLC SERPL-MCNC: 121 MG/DL (ref ?–130)
TRIGL SERPL-MCNC: 125 MG/DL (ref 30–149)
VIT D+METAB SERPL-MCNC: 26 NG/ML (ref 30–100)
VLDLC SERPL CALC-MCNC: 21 MG/DL (ref 0–30)

## 2022-05-18 PROCEDURE — 36415 COLL VENOUS BLD VENIPUNCTURE: CPT

## 2022-05-18 PROCEDURE — 80061 LIPID PANEL: CPT

## 2022-05-18 PROCEDURE — 82306 VITAMIN D 25 HYDROXY: CPT

## 2022-05-19 NOTE — PROGRESS NOTES
Sarina Khan is a 56year old female Acupuncture Therapy. Complaints:  1. Anxiety and stress    Response to last TX: NA    HPI: Perley Dakin is coming in today for anxiety and stress which is associated with an upcoming hysterectomy. She has concerns about the anesthesia as she has had negative experiences in the past.  She has a softball size fibroid for which she is getting the procedure. Objective: Pulse is wiry and thin. Tongue is short and purplish with 1/2 SLV distention    TCM Diagnosis: LR lawrence stasis      Plan:   Acupuncture Therapy: Bilateral appiah men  Second set: LT: LI 4, SJ 5, SP 9,6,4, LR 8, RT: PC 6, HT 5, LG 7, ST 40, GB 41    Face time 38 minutes  Total time 60 minutes    TX plan 1-2 tx per week for 4 weeks and re-evaluate      Treatment performed by Diana Siemens. at [facility]. No follow-ups on file.     Jorje Jordan, [facility]  5/19/2022  4:36 PM

## 2022-05-23 NOTE — PATIENT INSTRUCTIONS
For ear seeds: Stimulate them when fear or stress is high; apply pressure for 20-30 seconds. Kaity de la torre: I suggest practicing this exercise 1 x per day or if feeling fearful or under stress. If you cannot remember exactly how to do the exercise, don't worry as we can review it.

## 2022-05-27 ENCOUNTER — LAB ENCOUNTER (OUTPATIENT)
Dept: LAB | Facility: HOSPITAL | Age: 57
End: 2022-05-27
Attending: INTERNAL MEDICINE
Payer: COMMERCIAL

## 2022-05-27 DIAGNOSIS — Z12.11 COLON CANCER SCREENING: ICD-10-CM

## 2022-05-27 LAB — HEMOCCULT STL QL: NEGATIVE

## 2022-05-27 PROCEDURE — 82274 ASSAY TEST FOR BLOOD FECAL: CPT

## 2022-06-03 ENCOUNTER — LAB ENCOUNTER (OUTPATIENT)
Dept: LAB | Age: 57
End: 2022-06-03
Attending: OBSTETRICS & GYNECOLOGY
Payer: COMMERCIAL

## 2022-06-03 DIAGNOSIS — Z01.818 PREOP TESTING: ICD-10-CM

## 2022-06-03 LAB
ANTIBODY SCREEN: NEGATIVE
BASOPHILS # BLD AUTO: 0.04 X10(3) UL (ref 0–0.2)
BASOPHILS NFR BLD AUTO: 0.6 %
DEPRECATED RDW RBC AUTO: 43.3 FL (ref 35.1–46.3)
EOSINOPHIL # BLD AUTO: 0.12 X10(3) UL (ref 0–0.7)
EOSINOPHIL NFR BLD AUTO: 1.7 %
ERYTHROCYTE [DISTWIDTH] IN BLOOD BY AUTOMATED COUNT: 12.5 % (ref 11–15)
HCT VFR BLD AUTO: 40.5 %
HGB BLD-MCNC: 13.1 G/DL
IMM GRANULOCYTES # BLD AUTO: 0.02 X10(3) UL (ref 0–1)
IMM GRANULOCYTES NFR BLD: 0.3 %
LYMPHOCYTES # BLD AUTO: 1.69 X10(3) UL (ref 1–4)
LYMPHOCYTES NFR BLD AUTO: 24.5 %
MCH RBC QN AUTO: 30.5 PG (ref 26–34)
MCHC RBC AUTO-ENTMCNC: 32.3 G/DL (ref 31–37)
MCV RBC AUTO: 94.4 FL
MONOCYTES # BLD AUTO: 0.59 X10(3) UL (ref 0.1–1)
MONOCYTES NFR BLD AUTO: 8.5 %
NEUTROPHILS # BLD AUTO: 4.45 X10 (3) UL (ref 1.5–7.7)
NEUTROPHILS # BLD AUTO: 4.45 X10(3) UL (ref 1.5–7.7)
NEUTROPHILS NFR BLD AUTO: 64.4 %
PLATELET # BLD AUTO: 226 10(3)UL (ref 150–450)
RBC # BLD AUTO: 4.29 X10(6)UL
RH BLOOD TYPE: NEGATIVE
RH BLOOD TYPE: NEGATIVE
SARS-COV-2 RNA RESP QL NAA+PROBE: NOT DETECTED
WBC # BLD AUTO: 6.9 X10(3) UL (ref 4–11)

## 2022-06-03 PROCEDURE — 86850 RBC ANTIBODY SCREEN: CPT

## 2022-06-03 PROCEDURE — 36415 COLL VENOUS BLD VENIPUNCTURE: CPT

## 2022-06-03 PROCEDURE — 85025 COMPLETE CBC W/AUTO DIFF WBC: CPT

## 2022-06-03 PROCEDURE — 86901 BLOOD TYPING SEROLOGIC RH(D): CPT

## 2022-06-03 PROCEDURE — 86900 BLOOD TYPING SEROLOGIC ABO: CPT

## 2022-06-06 ENCOUNTER — ANESTHESIA (OUTPATIENT)
Dept: SURGERY | Facility: HOSPITAL | Age: 57
End: 2022-06-06
Payer: COMMERCIAL

## 2022-06-06 ENCOUNTER — HOSPITAL ENCOUNTER (INPATIENT)
Facility: HOSPITAL | Age: 57
LOS: 2 days | Discharge: HOME | End: 2022-06-08
Attending: OBSTETRICS & GYNECOLOGY | Admitting: OBSTETRICS & GYNECOLOGY
Payer: COMMERCIAL

## 2022-06-06 ENCOUNTER — ANESTHESIA EVENT (OUTPATIENT)
Dept: SURGERY | Facility: HOSPITAL | Age: 57
End: 2022-06-06
Payer: COMMERCIAL

## 2022-06-06 DIAGNOSIS — D21.9 FIBROID: ICD-10-CM

## 2022-06-06 DIAGNOSIS — Z01.818 PREOP TESTING: Primary | ICD-10-CM

## 2022-06-06 LAB — B-HCG UR QL: NEGATIVE

## 2022-06-06 PROCEDURE — 0UT20ZZ RESECTION OF BILATERAL OVARIES, OPEN APPROACH: ICD-10-PCS | Performed by: OBSTETRICS & GYNECOLOGY

## 2022-06-06 PROCEDURE — 58150 TOTAL HYSTERECTOMY: CPT | Performed by: OBSTETRICS & GYNECOLOGY

## 2022-06-06 PROCEDURE — 0UT70ZZ RESECTION OF BILATERAL FALLOPIAN TUBES, OPEN APPROACH: ICD-10-PCS | Performed by: OBSTETRICS & GYNECOLOGY

## 2022-06-06 PROCEDURE — 0UT90ZZ RESECTION OF UTERUS, OPEN APPROACH: ICD-10-PCS | Performed by: OBSTETRICS & GYNECOLOGY

## 2022-06-06 RX ORDER — ONDANSETRON 2 MG/ML
4 INJECTION INTRAMUSCULAR; INTRAVENOUS EVERY 6 HOURS PRN
Status: DISCONTINUED | OUTPATIENT
Start: 2022-06-06 | End: 2022-06-06 | Stop reason: HOSPADM

## 2022-06-06 RX ORDER — CEFAZOLIN SODIUM/WATER 2 G/20 ML
SYRINGE (ML) INTRAVENOUS AS NEEDED
Status: DISCONTINUED | OUTPATIENT
Start: 2022-06-06 | End: 2022-06-06 | Stop reason: SURG

## 2022-06-06 RX ORDER — HYDROMORPHONE HYDROCHLORIDE 1 MG/ML
0.4 INJECTION, SOLUTION INTRAMUSCULAR; INTRAVENOUS; SUBCUTANEOUS EVERY 5 MIN PRN
Status: DISCONTINUED | OUTPATIENT
Start: 2022-06-06 | End: 2022-06-06 | Stop reason: HOSPADM

## 2022-06-06 RX ORDER — METOCLOPRAMIDE 10 MG/1
10 TABLET ORAL ONCE
Status: COMPLETED | OUTPATIENT
Start: 2022-06-06 | End: 2022-06-06

## 2022-06-06 RX ORDER — DOCUSATE SODIUM 100 MG/1
100 CAPSULE, LIQUID FILLED ORAL 2 TIMES DAILY
Status: DISCONTINUED | OUTPATIENT
Start: 2022-06-06 | End: 2022-06-08

## 2022-06-06 RX ORDER — MIDAZOLAM HYDROCHLORIDE 1 MG/ML
INJECTION INTRAMUSCULAR; INTRAVENOUS AS NEEDED
Status: DISCONTINUED | OUTPATIENT
Start: 2022-06-06 | End: 2022-06-06 | Stop reason: SURG

## 2022-06-06 RX ORDER — NEOSTIGMINE METHYLSULFATE 1 MG/ML
INJECTION INTRAVENOUS AS NEEDED
Status: DISCONTINUED | OUTPATIENT
Start: 2022-06-06 | End: 2022-06-06 | Stop reason: SURG

## 2022-06-06 RX ORDER — ONDANSETRON 2 MG/ML
4 INJECTION INTRAMUSCULAR; INTRAVENOUS EVERY 6 HOURS PRN
Status: DISCONTINUED | OUTPATIENT
Start: 2022-06-06 | End: 2022-06-08

## 2022-06-06 RX ORDER — PROCHLORPERAZINE EDISYLATE 5 MG/ML
5 INJECTION INTRAMUSCULAR; INTRAVENOUS EVERY 8 HOURS PRN
Status: DISCONTINUED | OUTPATIENT
Start: 2022-06-06 | End: 2022-06-06 | Stop reason: HOSPADM

## 2022-06-06 RX ORDER — NALOXONE HYDROCHLORIDE 0.4 MG/ML
80 INJECTION, SOLUTION INTRAMUSCULAR; INTRAVENOUS; SUBCUTANEOUS AS NEEDED
Status: DISCONTINUED | OUTPATIENT
Start: 2022-06-06 | End: 2022-06-06 | Stop reason: HOSPADM

## 2022-06-06 RX ORDER — SODIUM CHLORIDE, SODIUM LACTATE, POTASSIUM CHLORIDE, CALCIUM CHLORIDE 600; 310; 30; 20 MG/100ML; MG/100ML; MG/100ML; MG/100ML
INJECTION, SOLUTION INTRAVENOUS CONTINUOUS
Status: DISCONTINUED | OUTPATIENT
Start: 2022-06-06 | End: 2022-06-06 | Stop reason: HOSPADM

## 2022-06-06 RX ORDER — ACETAMINOPHEN 500 MG
1000 TABLET ORAL ONCE
Status: COMPLETED | OUTPATIENT
Start: 2022-06-06 | End: 2022-06-06

## 2022-06-06 RX ORDER — MORPHINE SULFATE 10 MG/ML
6 INJECTION, SOLUTION INTRAMUSCULAR; INTRAVENOUS EVERY 10 MIN PRN
Status: DISCONTINUED | OUTPATIENT
Start: 2022-06-06 | End: 2022-06-06 | Stop reason: HOSPADM

## 2022-06-06 RX ORDER — MORPHINE SULFATE 4 MG/ML
4 INJECTION, SOLUTION INTRAMUSCULAR; INTRAVENOUS EVERY 10 MIN PRN
Status: DISCONTINUED | OUTPATIENT
Start: 2022-06-06 | End: 2022-06-06 | Stop reason: HOSPADM

## 2022-06-06 RX ORDER — NALOXONE HYDROCHLORIDE 0.4 MG/ML
0.08 INJECTION, SOLUTION INTRAMUSCULAR; INTRAVENOUS; SUBCUTANEOUS
Status: DISCONTINUED | OUTPATIENT
Start: 2022-06-06 | End: 2022-06-08

## 2022-06-06 RX ORDER — DIPHENHYDRAMINE HYDROCHLORIDE 50 MG/ML
12.5 INJECTION INTRAMUSCULAR; INTRAVENOUS EVERY 4 HOURS PRN
Status: DISCONTINUED | OUTPATIENT
Start: 2022-06-06 | End: 2022-06-08

## 2022-06-06 RX ORDER — DEXAMETHASONE SODIUM PHOSPHATE 4 MG/ML
VIAL (ML) INJECTION AS NEEDED
Status: DISCONTINUED | OUTPATIENT
Start: 2022-06-06 | End: 2022-06-06 | Stop reason: SURG

## 2022-06-06 RX ORDER — ONDANSETRON 4 MG/1
4 TABLET, FILM COATED ORAL EVERY 8 HOURS PRN
Status: DISCONTINUED | OUTPATIENT
Start: 2022-06-06 | End: 2022-06-08

## 2022-06-06 RX ORDER — IBUPROFEN 600 MG/1
600 TABLET ORAL EVERY 6 HOURS
Status: DISCONTINUED | OUTPATIENT
Start: 2022-06-06 | End: 2022-06-06

## 2022-06-06 RX ORDER — TRAMADOL HYDROCHLORIDE 50 MG/1
50 TABLET ORAL EVERY 6 HOURS PRN
Status: DISCONTINUED | OUTPATIENT
Start: 2022-06-07 | End: 2022-06-08

## 2022-06-06 RX ORDER — GLYCOPYRROLATE 0.2 MG/ML
INJECTION, SOLUTION INTRAMUSCULAR; INTRAVENOUS AS NEEDED
Status: DISCONTINUED | OUTPATIENT
Start: 2022-06-06 | End: 2022-06-06 | Stop reason: SURG

## 2022-06-06 RX ORDER — KETOROLAC TROMETHAMINE 30 MG/ML
30 INJECTION, SOLUTION INTRAMUSCULAR; INTRAVENOUS EVERY 6 HOURS
Status: COMPLETED | OUTPATIENT
Start: 2022-06-06 | End: 2022-06-07

## 2022-06-06 RX ORDER — MORPHINE SULFATE 4 MG/ML
2 INJECTION, SOLUTION INTRAMUSCULAR; INTRAVENOUS EVERY 10 MIN PRN
Status: DISCONTINUED | OUTPATIENT
Start: 2022-06-06 | End: 2022-06-06 | Stop reason: HOSPADM

## 2022-06-06 RX ORDER — KETOROLAC TROMETHAMINE 30 MG/ML
INJECTION, SOLUTION INTRAMUSCULAR; INTRAVENOUS AS NEEDED
Status: DISCONTINUED | OUTPATIENT
Start: 2022-06-06 | End: 2022-06-06 | Stop reason: SURG

## 2022-06-06 RX ORDER — METOCLOPRAMIDE HYDROCHLORIDE 5 MG/ML
10 INJECTION INTRAMUSCULAR; INTRAVENOUS EVERY 6 HOURS PRN
Status: DISCONTINUED | OUTPATIENT
Start: 2022-06-06 | End: 2022-06-08

## 2022-06-06 RX ORDER — HYDROMORPHONE HYDROCHLORIDE 1 MG/ML
0.2 INJECTION, SOLUTION INTRAMUSCULAR; INTRAVENOUS; SUBCUTANEOUS EVERY 5 MIN PRN
Status: DISCONTINUED | OUTPATIENT
Start: 2022-06-06 | End: 2022-06-06 | Stop reason: HOSPADM

## 2022-06-06 RX ORDER — LEVOTHYROXINE SODIUM 0.12 MG/1
125 TABLET ORAL DAILY
Status: DISCONTINUED | OUTPATIENT
Start: 2022-06-07 | End: 2022-06-08

## 2022-06-06 RX ORDER — SCOLOPAMINE TRANSDERMAL SYSTEM 1 MG/1
1 PATCH, EXTENDED RELEASE TRANSDERMAL ONCE
Status: DISCONTINUED | OUTPATIENT
Start: 2022-06-06 | End: 2022-06-06

## 2022-06-06 RX ORDER — FAMOTIDINE 20 MG/1
20 TABLET, FILM COATED ORAL ONCE
Status: COMPLETED | OUTPATIENT
Start: 2022-06-06 | End: 2022-06-06

## 2022-06-06 RX ORDER — IBUPROFEN 600 MG/1
600 TABLET ORAL EVERY 6 HOURS
Status: DISCONTINUED | OUTPATIENT
Start: 2022-06-07 | End: 2022-06-08

## 2022-06-06 RX ORDER — DEXTROSE, SODIUM CHLORIDE, SODIUM LACTATE, POTASSIUM CHLORIDE, AND CALCIUM CHLORIDE 5; .6; .31; .03; .02 G/100ML; G/100ML; G/100ML; G/100ML; G/100ML
INJECTION, SOLUTION INTRAVENOUS CONTINUOUS
Status: DISCONTINUED | OUTPATIENT
Start: 2022-06-06 | End: 2022-06-08

## 2022-06-06 RX ORDER — HYDROMORPHONE HYDROCHLORIDE 1 MG/ML
0.6 INJECTION, SOLUTION INTRAMUSCULAR; INTRAVENOUS; SUBCUTANEOUS EVERY 5 MIN PRN
Status: DISCONTINUED | OUTPATIENT
Start: 2022-06-06 | End: 2022-06-06 | Stop reason: HOSPADM

## 2022-06-06 RX ORDER — SODIUM CHLORIDE, SODIUM LACTATE, POTASSIUM CHLORIDE, CALCIUM CHLORIDE 600; 310; 30; 20 MG/100ML; MG/100ML; MG/100ML; MG/100ML
INJECTION, SOLUTION INTRAVENOUS CONTINUOUS
Status: DISCONTINUED | OUTPATIENT
Start: 2022-06-06 | End: 2022-06-06

## 2022-06-06 RX ORDER — ENOXAPARIN SODIUM 100 MG/ML
40 INJECTION SUBCUTANEOUS DAILY
Status: DISCONTINUED | OUTPATIENT
Start: 2022-06-06 | End: 2022-06-08

## 2022-06-06 RX ORDER — ROCURONIUM BROMIDE 10 MG/ML
INJECTION, SOLUTION INTRAVENOUS AS NEEDED
Status: DISCONTINUED | OUTPATIENT
Start: 2022-06-06 | End: 2022-06-06 | Stop reason: SURG

## 2022-06-06 RX ORDER — LIDOCAINE HYDROCHLORIDE 10 MG/ML
INJECTION, SOLUTION EPIDURAL; INFILTRATION; INTRACAUDAL; PERINEURAL AS NEEDED
Status: DISCONTINUED | OUTPATIENT
Start: 2022-06-06 | End: 2022-06-06 | Stop reason: SURG

## 2022-06-06 RX ORDER — SODIUM CHLORIDE 9 MG/ML
INJECTION, SOLUTION INTRAVENOUS CONTINUOUS
Status: DISCONTINUED | OUTPATIENT
Start: 2022-06-06 | End: 2022-06-08

## 2022-06-06 RX ORDER — ONDANSETRON 2 MG/ML
INJECTION INTRAMUSCULAR; INTRAVENOUS AS NEEDED
Status: DISCONTINUED | OUTPATIENT
Start: 2022-06-06 | End: 2022-06-06 | Stop reason: SURG

## 2022-06-06 RX ADMIN — SODIUM CHLORIDE, SODIUM LACTATE, POTASSIUM CHLORIDE, CALCIUM CHLORIDE: 600; 310; 30; 20 INJECTION, SOLUTION INTRAVENOUS at 08:21:00

## 2022-06-06 RX ADMIN — ROCURONIUM BROMIDE 10 MG: 10 INJECTION, SOLUTION INTRAVENOUS at 09:18:00

## 2022-06-06 RX ADMIN — ROCURONIUM BROMIDE 10 MG: 10 INJECTION, SOLUTION INTRAVENOUS at 08:33:00

## 2022-06-06 RX ADMIN — ROCURONIUM BROMIDE 10 MG: 10 INJECTION, SOLUTION INTRAVENOUS at 07:45:00

## 2022-06-06 RX ADMIN — CEFAZOLIN SODIUM/WATER 2 G: 2 G/20 ML SYRINGE (ML) INTRAVENOUS at 07:47:00

## 2022-06-06 RX ADMIN — ROCURONIUM BROMIDE 10 MG: 10 INJECTION, SOLUTION INTRAVENOUS at 08:03:00

## 2022-06-06 RX ADMIN — SODIUM CHLORIDE, SODIUM LACTATE, POTASSIUM CHLORIDE, CALCIUM CHLORIDE: 600; 310; 30; 20 INJECTION, SOLUTION INTRAVENOUS at 09:18:00

## 2022-06-06 RX ADMIN — SODIUM CHLORIDE, SODIUM LACTATE, POTASSIUM CHLORIDE, CALCIUM CHLORIDE: 600; 310; 30; 20 INJECTION, SOLUTION INTRAVENOUS at 08:20:00

## 2022-06-06 RX ADMIN — SODIUM CHLORIDE, SODIUM LACTATE, POTASSIUM CHLORIDE, CALCIUM CHLORIDE: 600; 310; 30; 20 INJECTION, SOLUTION INTRAVENOUS at 08:36:00

## 2022-06-06 RX ADMIN — ROCURONIUM BROMIDE 40 MG: 10 INJECTION, SOLUTION INTRAVENOUS at 07:32:00

## 2022-06-06 RX ADMIN — GLYCOPYRROLATE 0.8 MG: 0.2 INJECTION, SOLUTION INTRAMUSCULAR; INTRAVENOUS at 10:07:00

## 2022-06-06 RX ADMIN — LIDOCAINE HYDROCHLORIDE 50 MG: 10 INJECTION, SOLUTION EPIDURAL; INFILTRATION; INTRACAUDAL; PERINEURAL at 07:32:00

## 2022-06-06 RX ADMIN — SODIUM CHLORIDE, SODIUM LACTATE, POTASSIUM CHLORIDE, CALCIUM CHLORIDE: 600; 310; 30; 20 INJECTION, SOLUTION INTRAVENOUS at 08:51:00

## 2022-06-06 RX ADMIN — MIDAZOLAM HYDROCHLORIDE 2 MG: 1 INJECTION INTRAMUSCULAR; INTRAVENOUS at 07:29:00

## 2022-06-06 RX ADMIN — ONDANSETRON 4 MG: 2 INJECTION INTRAMUSCULAR; INTRAVENOUS at 09:46:00

## 2022-06-06 RX ADMIN — ROCURONIUM BROMIDE 5 MG: 10 INJECTION, SOLUTION INTRAVENOUS at 09:43:00

## 2022-06-06 RX ADMIN — DEXAMETHASONE SODIUM PHOSPHATE 8 MG: 4 MG/ML VIAL (ML) INJECTION at 07:47:00

## 2022-06-06 RX ADMIN — NEOSTIGMINE METHYLSULFATE 4 MG: 1 INJECTION INTRAVENOUS at 10:07:00

## 2022-06-06 RX ADMIN — SODIUM CHLORIDE, SODIUM LACTATE, POTASSIUM CHLORIDE, CALCIUM CHLORIDE: 600; 310; 30; 20 INJECTION, SOLUTION INTRAVENOUS at 07:29:00

## 2022-06-06 RX ADMIN — KETOROLAC TROMETHAMINE 30 MG: 30 INJECTION, SOLUTION INTRAMUSCULAR; INTRAVENOUS at 09:56:00

## 2022-06-06 NOTE — DISCHARGE SUMMARY
[facility]    Discharge Summary    [address] Patient Status:  Inpatient    1965 MRN [id number]   Location [facility] 4W/SW/SE Attending Zeferino Ribeiro MD   Hosp Day # 0 PCP Lavinia Stephens MD     Date of Admission: 2022     Date of Discharge: 2022    Admitting Diagnosis: Fibroid [D21.9]  Fibroid    Discharge Diagnosis: Patient Active Problem List:     Allergic rhinitis     Hyperlipidemia     Goiter     Ovarian cyst     Obesity     Dermatofibrosarcoma protuberans     Hashimoto's thyroiditis     Fibroids     History of adverse effect of anesthesia     Fibroid      History of Present Illness: 56year old    With large fibroid uterus    Procedure: Total abdominal hysterectomy and bilateral salpingo oophorectomy    Hospital Course: unremarkable. No significant nausea. Discharge Condition: discharged on POD # 2 in stable condition    Discharge Medications:      Discharge Medications      ASK your doctor about these medications      Instructions Prescription details   ASHWAGANDHA OR      Take 350 mg by mouth daily. Refills: 0     B COMPLEX OR      Take by mouth. Refills: 0     Caprylic Acid Liqd      Take 600 mg by mouth daily. Refills: 0     DAILY MULTIPLE VITAMIN/IRON OR      Take by mouth. Refills: 0     FISH OIL OR      Take by mouth. Refills: 0     levothyroxine 125 MCG Tabs  Commonly known as: Synthroid      Take 1 tablet (125 mcg total) by mouth daily. Quantity: 90 tablet  Refills: 1     Magnesium 100 MG Tabs      Take by mouth. Refills: 0     VITAMIN A OR      Inject into the muscle. Refills: 0     Vitamin D 125 MCG (5000 UT) Caps      Take 5,000 Units by mouth daily. Refills: 0            Follow up Visits:  Follow-up in 4 weeks for post op exam.        Brenda Villagran MD

## 2022-06-06 NOTE — OR PREOP
PIV placed to left hand without difficulty. Blood return noted. Pt c/o itching at the site of the iv. No redness noted no swelling. Iv taped securely with tegaderm. A second nurse assessed site. Same assessment. Iv infusing without difficulty. Anesthesia aware.

## 2022-06-06 NOTE — PLAN OF CARE
Kacey Sat is from home with her spouse. Alert and oriented x 4 . POD 0 TOMASZ , bilateral salpingo-oopherectomy. Denies n/v at this time-clears aat. Bm this am prior to surgery. Woodson cath patent , pca in place-toradol and motrin also ordered atc. Plan to [facility] to [facility] tomorrow. CBR tonight-pan to ambulate in am and dc woodson and pca. Sx drsg to abdomen c/d/I-afebrile/no s/s infection. Plan as identifief  Problem: Patient Centered Care  Goal: Patient preferences are identified and integrated in the patient's plan of care  Description: Interventions:  - What would you like us to know as we care for you?  I am gluten free  - Provide timely, complete, and accurate information to patient/family  - Incorporate patient and family knowledge, values, beliefs, and cultural backgrounds into the planning and delivery of care  - Encourage patient/family to participate in care and decision-making at the level they choose  - Honor patient and family perspectives and choices  Outcome: Progressing     Problem: Patient/Family Goals  Goal: Patient/Family Long Term Goal  Description: Patient's Long Term Goal: ind with adls    Interventions:  - pain control  mobilize  - See additional Care Plan goals for specific interventions  Outcome: Progressing  Goal: Patient/Family Short Term Goal  Description: Patient's Short Term Goal: home 1-2 days    Interventions:   - pain control  Varsha diet  mobilize  - See additional Care Plan goals for specific interventions  Outcome: Progressing     Problem: PAIN - ADULT  Goal: Verbalizes/displays adequate comfort level or patient's stated pain goal  Description: INTERVENTIONS:  - Encourage pt to monitor pain and request assistance  - Assess pain using appropriate pain scale  - Administer analgesics based on type and severity of pain and evaluate response  - Implement non-pharmacological measures as appropriate and evaluate response  - Consider cultural and social influences on pain and pain management  - Manage/alleviate anxiety  - Utilize distraction and/or relaxation techniques  - Monitor for opioid side effects  - Notify MD/LIP if interventions unsuccessful or patient reports new pain  - Anticipate increased pain with activity and pre-medicate as appropriate  Outcome: Progressing     Problem: RISK FOR INFECTION - ADULT  Goal: Absence of fever/infection during anticipated neutropenic period  Description: INTERVENTIONS  - Monitor WBC  - Administer growth factors as ordered  - Implement neutropenic guidelines  Outcome: Progressing     Problem: SAFETY ADULT - FALL  Goal: Free from fall injury  Description: INTERVENTIONS:  - Assess pt frequently for physical needs  - Identify cognitive and physical deficits and behaviors that affect risk of falls.   - [place] fall precautions as indicated by assessment.  - Educate pt/family on patient safety including physical limitations  - Instruct pt to call for assistance with activity based on assessment  - Modify environment to reduce risk of injury  - Provide assistive devices as appropriate  - Consider OT/PT consult to assist with strengthening/mobility  - Encourage toileting schedule  Outcome: Progressing     Problem: DISCHARGE PLANNING  Goal: Discharge to home or other facility with appropriate resources  Description: INTERVENTIONS:  - Identify barriers to discharge w/pt and caregiver  - Include patient/family/discharge partner in discharge planning  - Arrange for needed discharge resources and transportation as appropriate  - Identify discharge learning needs (meds, wound care, etc)  - Arrange for interpreters to assist at discharge as needed  - Consider post-discharge preferences of patient/family/discharge partner  - Complete POLST form as appropriate  - Assess patient's ability to be responsible for managing their own health  - Refer to Case Management Department for coordinating discharge planning if the patient needs post-hospital services based on physician/LIP order or complex needs related to functional status, cognitive ability or social support system  Outcome: Progressing

## 2022-06-06 NOTE — ANESTHESIA PROCEDURE NOTES
Airway  Date/Time: 6/6/2022 7:37 AM  Urgency: Elective    Airway not difficult    General Information and Staff    Patient location during procedure: OR  Anesthesiologist: Teddy Coon DO  Performed: anesthesiologist     Indications and Patient Condition  Indications for airway management: anesthesia  Sedation level: deep  Preoxygenated: yes  Patient position: sniffing  Mask difficulty assessment: 1 - vent by mask    Final Airway Details  Final airway type: endotracheal airway      Successful airway: ETT  Cuffed: yes   Successful intubation technique: direct laryngoscopy  Endotracheal tube insertion site: oral  Blade: Olimpia  Blade size: #3  ETT size (mm): 7.0    Cormack-Lehane Classification: grade I - full view of glottis  Placement verified by: chest auscultation and capnometry   Measured from: lips  ETT to lips (cm): 21  Number of attempts at approach: 1  Number of other approaches attempted: 0

## 2022-06-06 NOTE — BRIEF OP NOTE
Pre-Operative Diagnosis: Fibroid [D21.9]     Post-Operative Diagnosis: Fibroid [D21.9]      Procedure Performed:   TOTAL ABDOMINAL HYSTERECTOMY- BILATERAL SALPINGO-OOPHORECTOMY    Surgeon(s) and Role:     Carola Whitt MD - Primary     * Anna Jacobson MD - Assisting Surgeon     Surgical Findings:  Small uterus with small fibroids and elongated cervix, approximately  6 cm. Normal tubes and ovaries. Approximately 23 cm fibroid arising from left uterine side wall extending into the broad ligament, distorting anatomy.       Specimen: fibroid, uterus, cervix, bilateral tubes and ovaries     Estimated Blood Loss: 200cc        Mey Silva MD  6/6/2022  10:34 AM

## 2022-06-07 LAB
DEPRECATED RDW RBC AUTO: 44.4 FL (ref 35.1–46.3)
ERYTHROCYTE [DISTWIDTH] IN BLOOD BY AUTOMATED COUNT: 12.6 % (ref 11–15)
HCT VFR BLD AUTO: 33.1 %
HGB BLD-MCNC: 10.4 G/DL
MCH RBC QN AUTO: 30.2 PG (ref 26–34)
MCHC RBC AUTO-ENTMCNC: 31.4 G/DL (ref 31–37)
MCV RBC AUTO: 96.2 FL
PLATELET # BLD AUTO: 154 10(3)UL (ref 150–450)
RBC # BLD AUTO: 3.44 X10(6)UL
WBC # BLD AUTO: 7.1 X10(3) UL (ref 4–11)

## 2022-06-07 RX ORDER — ACETAMINOPHEN 500 MG
1000 TABLET ORAL EVERY 6 HOURS PRN
Status: DISCONTINUED | OUTPATIENT
Start: 2022-06-07 | End: 2022-06-08

## 2022-06-07 NOTE — OPERATIVE REPORT
[facility]    PATIENT'S NAME: Sonia Ortiz   ATTENDING PHYSICIAN: Erica Valiente MD   OPERATING PHYSICIAN: Erica Valiente MD   PATIENT ACCOUNT#:   [de-identified]    LOCATION:  [address] [place] [address] RECORD #:   [id number]       YOB: 1965  ADMISSION DATE:       06/06/2022      OPERATION DATE:  06/06/2022    OPERATIVE REPORT    PREOPERATIVE DIAGNOSIS:   Fibroid uterus. POSTOPERATIVE DIAGNOSIS:  Fibroid uterus. PROCEDURE:  Total abdominal hysterectomy, bilateral salpingo-oophorectomy. ANESTHESIA:  General endotracheal.    ASSISTANT SURGEON:  Tramaine Samuels MD.    ESTIMATED BLOOD LOSS:  200 mL. COMPLICATIONS:  There were no complications. FINDINGS:    Exam--uterine fundus was 3 FB above umbilicus. Small uterus with small fibroids, elongated cervix, approximately 5-6 cm. Normal tubes and ovaries. Approximately 23 cm fibroid arising from the left uterine sidewall, extending into the broad ligament distorting the anatomy on that side. OPERATIVE TECHNIQUE:  After induction of general anesthesia, the patient was sterilely prepped and draped. A Pfannenstiel skin incision was made approximately 3 fingerbreadths above the pubic bone and this was carried down through the subcutaneous tissue to the level of the rectus fascia. The rectus fascia was incised and the rectus muscles were dissected from the rectus fascia superiorly and inferiorly. The peritoneum was entered uneventfully. Findings are noted above. The uterus, which was approximately 3 or so fingerbreadths above the umbilicus, was slowly delivered through the incision bit by bit. It was noted that the bulk of the mass was basically a left-sided, what appeared to be a broad ligament fibroid. The uterus was tiny. The broad ligament was incised and the left round ligament was excised and ligated with 0 Vicryl. The fibroid was skeletonized to its pedunculated insertion of the left uterine sidewall.   The large vessels on the pedunculated large fibroid were clamped, cut, and ligated with 0 Vicryl. Then the pedunculated portion of the large fibroid was clamped with curved Heaneys and the fibroid was excised. The pedicles were suture ligated with 0 Vicryl. Attention was then directed to the hysterectomy. The left infundibulopelvic ligament was skeletonized. It was clamped right underneath the ovary, cut and suture ligated with 0 Vicryl and free tied with 0 Vicryl. On the right side, the round ligament was clamped, cut, and suture ligated with 0 Vicryl. The right infundibulopelvic ligament was clamped, cut, and suture ligated with 0 Vicryl and free tied with 0 Vicryl. The bladder flap was created and then the uterine vasculature was skeletonized. The uterine vasculature was bilaterally clamped, cut, and suture ligated with 0 Vicryl. The cervix was very elongated and so serial pedicles with a straight Marisa was used to clamp, cut and suture ligated with 0 Vicryl. The cardinal and the uterosacral ligaments were bilaterally clamped, cut, and suture ligated. Curved Marisa clamps were placed underneath the cervix and the uterus and cervix were removed from the field. The corners of the vagina were suture ligated with 0 Vicryl and then the vaginal cuff was closed with a series of figure-of-eight sutures of 0 Vicryl. There were some small bleeders in the left side where the fibroid was. Small vessels were identified, clamped, and suture ligated with 2-0 Vicryl. Hemostasis was good. There was some redundant peritoneal tissue that was just trimmed and removed. The pelvis was irrigated and everything was hemostatic. The rectus fascia was closed with a continuous suture of 0 Vicryl starting at either end and meeting in the midline. The subcutaneous tissues were irrigated and found to be hemostatic.   The subcutaneous tissues were reapproximated with a continuous suture of 3-0 plain suture and the skin was closed with a subcuticular stitch using a 4-0 Vicryl and Steri-Strips were applied. The final needle, sponge, and instrument counts were correct. EBL 200cc. The Benitez was draining clear yellow urine. Specimens included the large fibroid, uterus, cervix, bilateral tubes and ovaries. The patient tolerated the procedure well and was taken to the recovery room in satisfactory condition.     Dictated By Kostas Lopez MD  d: 06/06/2022 21:22:16  t: 06/07/2022 04:45:23  [facility] [id number]  PTU/

## 2022-06-07 NOTE — PLAN OF CARE
Pt is alert and oriented x4. Tolerating clear liquid diet, advance as tolerated. Scheduled Toradol and Dilaudid PCA for pain management. Benitez in place and draining, will remove at 6am. IVF infusing. Surgical dressing is clean, dry, and intact. Family at bedside. Call light within reach.     Problem: Patient Centered Care  Goal: Patient preferences are identified and integrated in the patient's plan of care  Description: Interventions:  - What would you like us to know as we care for you?   - Provide timely, complete, and accurate information to patient/family  - Incorporate patient and family knowledge, values, beliefs, and cultural backgrounds into the planning and delivery of care  - Encourage patient/family to participate in care and decision-making at the level they choose  - Honor patient and family perspectives and choices  Outcome: Progressing     Problem: Patient/Family Goals  Goal: Patient/Family Long Term Goal  Description: Patient's Long Term Goal:     Interventions:  -   - See additional Care Plan goals for specific interventions  Outcome: Progressing  Goal: Patient/Family Short Term Goal  Description: Patient's Short Term Goal:     Interventions:   -  - See additional Care Plan goals for specific interventions  Outcome: Progressing     Problem: PAIN - ADULT  Goal: Verbalizes/displays adequate comfort level or patient's stated pain goal  Description: INTERVENTIONS:  - Encourage pt to monitor pain and request assistance  - Assess pain using appropriate pain scale  - Administer analgesics based on type and severity of pain and evaluate response  - Implement non-pharmacological measures as appropriate and evaluate response  - Consider cultural and social influences on pain and pain management  - Manage/alleviate anxiety  - Utilize distraction and/or relaxation techniques  - Monitor for opioid side effects  - Notify MD/LIP if interventions unsuccessful or patient reports new pain  - Anticipate increased pain with activity and pre-medicate as appropriate  Outcome: Progressing     Problem: RISK FOR INFECTION - ADULT  Goal: Absence of fever/infection during anticipated neutropenic period  Description: INTERVENTIONS  - Monitor WBC  - Administer growth factors as ordered  - Implement neutropenic guidelines  Outcome: Progressing     Problem: SAFETY ADULT - FALL  Goal: Free from fall injury  Description: INTERVENTIONS:  - Assess pt frequently for physical needs  - Identify cognitive and physical deficits and behaviors that affect risk of falls.   - [place] fall precautions as indicated by assessment.  - Educate pt/family on patient safety including physical limitations  - Instruct pt to call for assistance with activity based on assessment  - Modify environment to reduce risk of injury  - Provide assistive devices as appropriate  - Consider OT/PT consult to assist with strengthening/mobility  - Encourage toileting schedule  Outcome: Progressing     Problem: DISCHARGE PLANNING  Goal: Discharge to home or other facility with appropriate resources  Description: INTERVENTIONS:  - Identify barriers to discharge w/pt and caregiver  - Include patient/family/discharge partner in discharge planning  - Arrange for needed discharge resources and transportation as appropriate  - Identify discharge learning needs (meds, wound care, etc)  - Arrange for interpreters to assist at discharge as needed  - Consider post-discharge preferences of patient/family/discharge partner  - Complete POLST form as appropriate  - Assess patient's ability to be responsible for managing their own health  - Refer to Case Management Department for coordinating discharge planning if the patient needs post-hospital services based on physician/LIP order or complex needs related to functional status, cognitive ability or social support system  Outcome: Progressing

## 2022-06-08 VITALS
WEIGHT: 234 LBS | HEART RATE: 58 BPM | HEIGHT: 65 IN | DIASTOLIC BLOOD PRESSURE: 61 MMHG | BODY MASS INDEX: 38.99 KG/M2 | TEMPERATURE: 98 F | SYSTOLIC BLOOD PRESSURE: 111 MMHG | RESPIRATION RATE: 18 BRPM | OXYGEN SATURATION: 97 %

## 2022-06-08 PROBLEM — D21.9 FIBROID: Status: RESOLVED | Noted: 2022-06-06 | Resolved: 2022-06-08

## 2022-06-08 RX ORDER — ACETAMINOPHEN 500 MG
1000 TABLET ORAL EVERY 6 HOURS PRN
Qty: 30 TABLET | Refills: 0 | Status: SHIPPED | OUTPATIENT
Start: 2022-06-08

## 2022-06-08 RX ORDER — IBUPROFEN 600 MG/1
600 TABLET ORAL EVERY 6 HOURS
Qty: 30 TABLET | Refills: 1 | Status: SHIPPED | OUTPATIENT
Start: 2022-06-08

## 2022-06-08 NOTE — PLAN OF CARE
Dana is alert/oriented. Vitals stable on room air. Tolerating diet. Passing gas, no BM yet. Ambulating frequently independently. Pain control with ibuprofen, tylenol and ice packs. Incision intact with steri strips. Voiding adequately. Cleared for discharge by Dr. Kwasi Gunter. IV removed. Discharge summary given and explained. All post op instructions, medications and follow up information reviewed. All questions answered at this time. Plan for possible discharge home today.    Problem: Patient Centered Care  Goal: Patient preferences are identified and integrated in the patient's plan of care  Description: Interventions:  - What would you like us to know as we care for you?   - Provide timely, complete, and accurate information to patient/family  - Incorporate patient and family knowledge, values, beliefs, and cultural backgrounds into the planning and delivery of care  - Encourage patient/family to participate in care and decision-making at the level they choose  - Honor patient and family perspectives and choices  Outcome: Progressing     Problem: Patient/Family Goals  Goal: Patient/Family Long Term Goal  Description: Patient's Long Term Goal: return home    Interventions:  - monitor and maintain VS and labs WNL  - administer meds as scheduled  - tolerate diet  - nausea management  - pain management  - ambulate  - pass gas  - monitor wounds for s/s of complications  - See additional Care Plan goals for specific interventions  Outcome: Progressing  Goal: Patient/Family Short Term Goal  Description: Patient's Short Term Goal: pain well controlled    Interventions:   - scheduled motrin  - prn tylenol  - prn tramadol  - non pharmacologic methods  - See additional Care Plan goals for specific interventions  Outcome: Progressing     Problem: PAIN - ADULT  Goal: Verbalizes/displays adequate comfort level or patient's stated pain goal  Description: INTERVENTIONS:  - Encourage pt to monitor pain and request assistance  - Assess pain using appropriate pain scale  - Administer analgesics based on type and severity of pain and evaluate response  - Implement non-pharmacological measures as appropriate and evaluate response  - Consider cultural and social influences on pain and pain management  - Manage/alleviate anxiety  - Utilize distraction and/or relaxation techniques  - Monitor for opioid side effects  - Notify MD/LIP if interventions unsuccessful or patient reports new pain  - Anticipate increased pain with activity and pre-medicate as appropriate  Outcome: Progressing     Problem: RISK FOR INFECTION - ADULT  Goal: Absence of fever/infection during anticipated neutropenic period  Description: INTERVENTIONS  - Monitor WBC  - Administer growth factors as ordered  - Implement neutropenic guidelines  Outcome: Progressing     Problem: SAFETY ADULT - FALL  Goal: Free from fall injury  Description: INTERVENTIONS:  - Assess pt frequently for physical needs  - Identify cognitive and physical deficits and behaviors that affect risk of falls.   - [place] fall precautions as indicated by assessment.  - Educate pt/family on patient safety including physical limitations  - Instruct pt to call for assistance with activity based on assessment  - Modify environment to reduce risk of injury  - Provide assistive devices as appropriate  - Consider OT/PT consult to assist with strengthening/mobility  - Encourage toileting schedule  Outcome: Progressing     Problem: DISCHARGE PLANNING  Goal: Discharge to home or other facility with appropriate resources  Description: INTERVENTIONS:  - Identify barriers to discharge w/pt and caregiver  - Include patient/family/discharge partner in discharge planning  - Arrange for needed discharge resources and transportation as appropriate  - Identify discharge learning needs (meds, wound care, etc)  - Arrange for interpreters to assist at discharge as needed  - Consider post-discharge preferences of patient/family/discharge partner  - Complete POLST form as appropriate  - Assess patient's ability to be responsible for managing their own health  - Refer to Case Management Department for coordinating discharge planning if the patient needs post-hospital services based on physician/LIP order or complex needs related to functional status, cognitive ability or social support system  Outcome: Progressing     Problem: GASTROINTESTINAL - ADULT  Goal: Minimal or absence of nausea and vomiting  Description: INTERVENTIONS:  - Maintain adequate hydration with IV or PO as ordered and tolerated  - Nasogastric tube to low intermittent suction as ordered  - Evaluate effectiveness of ordered antiemetic medications  - Provide nonpharmacologic comfort measures as appropriate  - Advance diet as tolerated, if ordered  - Obtain nutritional consult as needed  - Evaluate fluid balance  Outcome: Progressing  Goal: Maintains or returns to baseline bowel function  Description: INTERVENTIONS:  - Assess bowel function  - Maintain adequate hydration with IV or PO as ordered and tolerated  - Evaluate effectiveness of GI medications  - Encourage mobilization and activity  - Obtain nutritional consult as needed  - Establish a toileting routine/schedule  - Consider collaborating with pharmacy to review patient's medication profile  Outcome: Progressing     Problem: SKIN/TISSUE INTEGRITY - ADULT  Goal: Incision(s), wounds(s) or drain site(s) healing without S/S of infection  Description: INTERVENTIONS:  - Assess and document risk factors for pressure ulcer development  - Assess and document skin integrity  - Assess and document dressing/incision, wound bed, drain sites and surrounding tissue  - Implement wound care per orders  - Initiate isolation precautions as appropriate  - Initiate Pressure Ulcer prevention bundle as indicated  Outcome: Progressing

## 2022-06-08 NOTE — PLAN OF CARE
Problem: Patient Centered Care  Goal: Patient preferences are identified and integrated in the patient's plan of care  Description: Interventions:  - What would you like us to know as we care for you?   - Provide timely, complete, and accurate information to patient/family  - Incorporate patient and family knowledge, values, beliefs, and cultural backgrounds into the planning and delivery of care  - Encourage patient/family to participate in care and decision-making at the level they choose  - Honor patient and family perspectives and choices  Outcome: Progressing     Problem: Patient/Family Goals  Goal: Patient/Family Long Term Goal  Description: Patient's Long Term Goal: return home    Interventions:  - monitor and maintain VS and labs WNL  - administer meds as scheduled  - tolerate diet  - nausea management  - pain management  - ambulate  - pass gas  - monitor wounds for s/s of complications  - See additional Care Plan goals for specific interventions  Outcome: Progressing  Goal: Patient/Family Short Term Goal  Description: Patient's Short Term Goal: pain well controlled    Interventions:   - scheduled motrin  - prn tylenol  - prn tramadol  - non pharmacologic methods  - See additional Care Plan goals for specific interventions  Outcome: Progressing     Problem: PAIN - ADULT  Goal: Verbalizes/displays adequate comfort level or patient's stated pain goal  Description: INTERVENTIONS:  - Encourage pt to monitor pain and request assistance  - Assess pain using appropriate pain scale  - Administer analgesics based on type and severity of pain and evaluate response  - Implement non-pharmacological measures as appropriate and evaluate response  - Consider cultural and social influences on pain and pain management  - Manage/alleviate anxiety  - Utilize distraction and/or relaxation techniques  - Monitor for opioid side effects  - Notify MD/LIP if interventions unsuccessful or patient reports new pain  - Anticipate increased pain with activity and pre-medicate as appropriate  Outcome: Progressing     Problem: RISK FOR INFECTION - ADULT  Goal: Absence of fever/infection during anticipated neutropenic period  Description: INTERVENTIONS  - Monitor WBC  - Administer growth factors as ordered  - Implement neutropenic guidelines  Outcome: Progressing     Problem: SAFETY ADULT - FALL  Goal: Free from fall injury  Description: INTERVENTIONS:  - Assess pt frequently for physical needs  - Identify cognitive and physical deficits and behaviors that affect risk of falls.   - [place] fall precautions as indicated by assessment.  - Educate pt/family on patient safety including physical limitations  - Instruct pt to call for assistance with activity based on assessment  - Modify environment to reduce risk of injury  - Provide assistive devices as appropriate  - Consider OT/PT consult to assist with strengthening/mobility  - Encourage toileting schedule  Outcome: Progressing     Problem: DISCHARGE PLANNING  Goal: Discharge to home or other facility with appropriate resources  Description: INTERVENTIONS:  - Identify barriers to discharge w/pt and caregiver  - Include patient/family/discharge partner in discharge planning  - Arrange for needed discharge resources and transportation as appropriate  - Identify discharge learning needs (meds, wound care, etc)  - Arrange for interpreters to assist at discharge as needed  - Consider post-discharge preferences of patient/family/discharge partner  - Complete POLST form as appropriate  - Assess patient's ability to be responsible for managing their own health  - Refer to Case Management Department for coordinating discharge planning if the patient needs post-hospital services based on physician/LIP order or complex needs related to functional status, cognitive ability or social support system  Outcome: Progressing     Problem: GASTROINTESTINAL - ADULT  Goal: Minimal or absence of nausea and vomiting  Description: INTERVENTIONS:  - Maintain adequate hydration with IV or PO as ordered and tolerated  - Nasogastric tube to low intermittent suction as ordered  - Evaluate effectiveness of ordered antiemetic medications  - Provide nonpharmacologic comfort measures as appropriate  - Advance diet as tolerated, if ordered  - Obtain nutritional consult as needed  - Evaluate fluid balance  Outcome: Progressing  Goal: Maintains or returns to baseline bowel function  Description: INTERVENTIONS:  - Assess bowel function  - Maintain adequate hydration with IV or PO as ordered and tolerated  - Evaluate effectiveness of GI medications  - Encourage mobilization and activity  - Obtain nutritional consult as needed  - Establish a toileting routine/schedule  - Consider collaborating with pharmacy to review patient's medication profile  Outcome: Progressing     Problem: SKIN/TISSUE INTEGRITY - ADULT  Goal: Incision(s), wounds(s) or drain site(s) healing without S/S of infection  Description: INTERVENTIONS:  - Assess and document risk factors for pressure ulcer development  - Assess and document skin integrity  - Assess and document dressing/incision, wound bed, drain sites and surrounding tissue  - Implement wound care per orders  - Initiate isolation precautions as appropriate  - Initiate Pressure Ulcer prevention bundle as indicated  Outcome: Progressing     Patient is alert and oriented x 4. On room air. Vitals stable. On lovenox and SCDs for DVT prophylaxis. Tolerating general diet. Belching, but not passing gas. Voiding freely. Pain managed with scheduled motrin and ice packs. Saline locked. Abdominal dressing dry and intact. Up independently. Ambulated in [place] last night. Fall precautions in place. Call light within reach.  at bedside. Patient and  updated on plan of care. Plan for possible discharge home today.

## 2022-06-09 ENCOUNTER — TELEPHONE (OUTPATIENT)
Dept: OBGYN CLINIC | Facility: CLINIC | Age: 57
End: 2022-06-09

## 2022-06-09 NOTE — TELEPHONE ENCOUNTER
Patient with TOMASZ-BSO 6/6/22 with [facility]. Needs appt week of 7/4 for 4 week Post-op. [facility] you have no scheduled week of 7/4. Would you like to see her week prior or week of 7/11?

## 2022-06-09 NOTE — TELEPHONE ENCOUNTER
Patient had a surgical procedure done on 6/6. She needs a post op appointment on or around 7/4. Please call.

## 2022-07-11 ENCOUNTER — OFFICE VISIT (OUTPATIENT)
Dept: OBGYN CLINIC | Facility: CLINIC | Age: 57
End: 2022-07-11
Payer: COMMERCIAL

## 2022-07-11 VITALS
SYSTOLIC BLOOD PRESSURE: 122 MMHG | HEART RATE: 80 BPM | BODY MASS INDEX: 37 KG/M2 | WEIGHT: 222.63 LBS | DIASTOLIC BLOOD PRESSURE: 84 MMHG

## 2022-07-11 DIAGNOSIS — Z98.890 POST-OPERATIVE STATE: Primary | ICD-10-CM

## 2022-07-11 PROCEDURE — 3074F SYST BP LT 130 MM HG: CPT | Performed by: OBSTETRICS & GYNECOLOGY

## 2022-07-11 PROCEDURE — 99024 POSTOP FOLLOW-UP VISIT: CPT | Performed by: OBSTETRICS & GYNECOLOGY

## 2022-07-11 PROCEDURE — 3079F DIAST BP 80-89 MM HG: CPT | Performed by: OBSTETRICS & GYNECOLOGY

## 2022-07-12 ENCOUNTER — OFFICE VISIT (OUTPATIENT)
Dept: INTEGRATIVE MEDICINE | Facility: CLINIC | Age: 57
End: 2022-07-12
Payer: COMMERCIAL

## 2022-07-12 VITALS
BODY MASS INDEX: 36.93 KG/M2 | WEIGHT: 221.63 LBS | HEIGHT: 65 IN | SYSTOLIC BLOOD PRESSURE: 112 MMHG | OXYGEN SATURATION: 98 % | DIASTOLIC BLOOD PRESSURE: 70 MMHG | HEART RATE: 86 BPM

## 2022-07-12 DIAGNOSIS — D25.9 UTERINE LEIOMYOMA, UNSPECIFIED LOCATION: ICD-10-CM

## 2022-07-12 DIAGNOSIS — E06.3 HASHIMOTO'S THYROIDITIS: ICD-10-CM

## 2022-07-12 DIAGNOSIS — K59.00 CONSTIPATION, UNSPECIFIED CONSTIPATION TYPE: ICD-10-CM

## 2022-07-12 DIAGNOSIS — E04.1 THYROID NODULE: ICD-10-CM

## 2022-07-12 DIAGNOSIS — E78.1 HYPERTRIGLYCERIDEMIA: ICD-10-CM

## 2022-07-12 DIAGNOSIS — E03.9 HYPOTHYROIDISM, UNSPECIFIED TYPE: Primary | ICD-10-CM

## 2022-07-12 DIAGNOSIS — R53.82 CHRONIC FATIGUE: ICD-10-CM

## 2022-07-12 PROCEDURE — 3008F BODY MASS INDEX DOCD: CPT | Performed by: FAMILY MEDICINE

## 2022-07-12 PROCEDURE — 99214 OFFICE O/P EST MOD 30 MIN: CPT | Performed by: FAMILY MEDICINE

## 2022-07-12 PROCEDURE — 3074F SYST BP LT 130 MM HG: CPT | Performed by: FAMILY MEDICINE

## 2022-07-12 PROCEDURE — 3078F DIAST BP <80 MM HG: CPT | Performed by: FAMILY MEDICINE

## 2022-07-18 ENCOUNTER — PATIENT MESSAGE (OUTPATIENT)
Dept: ENDOCRINOLOGY CLINIC | Facility: CLINIC | Age: 57
End: 2022-07-18

## 2022-08-04 ENCOUNTER — OFFICE VISIT (OUTPATIENT)
Dept: DERMATOLOGY CLINIC | Facility: CLINIC | Age: 57
End: 2022-08-04
Payer: COMMERCIAL

## 2022-08-04 DIAGNOSIS — D18.01 CHERRY HEMANGIOMA: ICD-10-CM

## 2022-08-04 DIAGNOSIS — D23.5 BENIGN NEOPLASM OF SKIN OF TRUNK, EXCEPT SCROTUM: ICD-10-CM

## 2022-08-04 DIAGNOSIS — D23.30 BENIGN NEOPLASM OF SKIN OF FACE: ICD-10-CM

## 2022-08-04 DIAGNOSIS — D23.70 BENIGN NEOPLASM OF SKIN OF LOWER LIMB, INCLUDING HIP, UNSPECIFIED LATERALITY: ICD-10-CM

## 2022-08-04 DIAGNOSIS — L82.1 SEBORRHEIC KERATOSES: ICD-10-CM

## 2022-08-04 DIAGNOSIS — D23.60 BENIGN NEOPLASM OF SKIN OF UPPER LIMB, INCLUDING SHOULDER, UNSPECIFIED LATERALITY: ICD-10-CM

## 2022-08-04 DIAGNOSIS — D22.9 MULTIPLE NEVI: ICD-10-CM

## 2022-08-04 DIAGNOSIS — D23.4 BENIGN NEOPLASM OF SCALP AND SKIN OF NECK: ICD-10-CM

## 2022-08-04 DIAGNOSIS — Z12.83 SKIN CANCER SCREENING: ICD-10-CM

## 2022-08-04 DIAGNOSIS — C44.99 DERMATOFIBROSARCOMA PROTUBERANS: Primary | ICD-10-CM

## 2022-08-04 PROCEDURE — 99214 OFFICE O/P EST MOD 30 MIN: CPT | Performed by: DERMATOLOGY

## 2022-08-08 ENCOUNTER — LAB ENCOUNTER (OUTPATIENT)
Dept: LAB | Age: 57
End: 2022-08-08
Attending: INTERNAL MEDICINE
Payer: COMMERCIAL

## 2022-08-08 ENCOUNTER — HOSPITAL ENCOUNTER (OUTPATIENT)
Dept: ULTRASOUND IMAGING | Age: 57
Discharge: HOME | End: 2022-08-08
Attending: INTERNAL MEDICINE
Payer: COMMERCIAL

## 2022-08-08 DIAGNOSIS — E55.9 VITAMIN D DEFICIENCY: ICD-10-CM

## 2022-08-08 DIAGNOSIS — E04.1 THYROID NODULE: ICD-10-CM

## 2022-08-08 LAB — VIT D+METAB SERPL-MCNC: 26.7 NG/ML (ref 30–100)

## 2022-08-08 PROCEDURE — 36415 COLL VENOUS BLD VENIPUNCTURE: CPT

## 2022-08-08 PROCEDURE — 76536 US EXAM OF HEAD AND NECK: CPT | Performed by: INTERNAL MEDICINE

## 2022-08-08 PROCEDURE — 82306 VITAMIN D 25 HYDROXY: CPT

## 2022-08-16 ENCOUNTER — OFFICE VISIT (OUTPATIENT)
Dept: ENDOCRINOLOGY CLINIC | Facility: CLINIC | Age: 57
End: 2022-08-16
Payer: COMMERCIAL

## 2022-08-16 ENCOUNTER — LAB ENCOUNTER (OUTPATIENT)
Dept: LAB | Facility: HOSPITAL | Age: 57
End: 2022-08-16
Attending: INTERNAL MEDICINE
Payer: COMMERCIAL

## 2022-08-16 VITALS
SYSTOLIC BLOOD PRESSURE: 115 MMHG | HEART RATE: 83 BPM | DIASTOLIC BLOOD PRESSURE: 78 MMHG | WEIGHT: 223 LBS | BODY MASS INDEX: 37 KG/M2

## 2022-08-16 DIAGNOSIS — E04.1 THYROID NODULE: ICD-10-CM

## 2022-08-16 DIAGNOSIS — E55.9 VITAMIN D DEFICIENCY: Primary | ICD-10-CM

## 2022-08-16 DIAGNOSIS — E03.9 HYPOTHYROIDISM, UNSPECIFIED TYPE: ICD-10-CM

## 2022-08-16 LAB — TSI SER-ACNC: 0.64 MIU/ML (ref 0.36–3.74)

## 2022-08-16 PROCEDURE — 36415 COLL VENOUS BLD VENIPUNCTURE: CPT

## 2022-08-16 PROCEDURE — 84443 ASSAY THYROID STIM HORMONE: CPT

## 2022-08-16 PROCEDURE — 99214 OFFICE O/P EST MOD 30 MIN: CPT | Performed by: INTERNAL MEDICINE

## 2022-08-16 PROCEDURE — 3078F DIAST BP <80 MM HG: CPT | Performed by: INTERNAL MEDICINE

## 2022-08-16 PROCEDURE — 3074F SYST BP LT 130 MM HG: CPT | Performed by: INTERNAL MEDICINE

## 2022-08-16 RX ORDER — ERGOCALCIFEROL (VITAMIN D2) 1250 MCG
CAPSULE ORAL
Qty: 8 CAPSULE | Refills: 0 | Status: SHIPPED | OUTPATIENT
Start: 2022-08-16 | End: 2022-08-22

## 2022-08-18 ENCOUNTER — TELEPHONE (OUTPATIENT)
Dept: OBGYN CLINIC | Facility: CLINIC | Age: 57
End: 2022-08-18

## 2022-08-22 RX ORDER — ERGOCALCIFEROL (VITAMIN D2) 1250 MCG
CAPSULE ORAL
Qty: 8 CAPSULE | Refills: 0 | Status: SHIPPED | OUTPATIENT
Start: 2022-08-22 | End: 2022-11-20

## 2022-09-13 RX ORDER — LEVOTHYROXINE SODIUM 0.12 MG/1
125 TABLET ORAL DAILY
Qty: 90 TABLET | Refills: 1 | Status: SHIPPED | OUTPATIENT
Start: 2022-09-13 | End: 2023-03-07

## 2022-09-13 NOTE — TELEPHONE ENCOUNTER
Refill passed per [place] [facility] protocol.    Requested Prescriptions   Pending Prescriptions Disp Refills    LEVOTHYROXINE 125 MCG Oral Tab [Pharmacy Med Name: LEVOTHYROXINE 0.125MG (125MCG) TAB] 90 tablet 1     Sig: TAKE 1 TABLET(125 MCG) BY MOUTH DAILY        Thyroid Medication Protocol Passed - 9/13/2022  9:43 AM        Passed - TSH in past 12 months        Passed - Last TSH value is normal     Lab Results   Component Value Date    TSH 0.636 08/16/2022                 Passed - In person appointment or virtual visit in the past 12 mos or appointment in next 3 mos       Recent Outpatient Visits              4 weeks ago Vitamin D deficiency    [place] 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    [address] Dermatology Bonita Velez MD    Office Visit    2 months ago Hypothyroidism, unspecified type    [employer], [facility], [address] [place] [address]    Office Visit    2 months ago Post-operative [facility]    Toni Latham MD    Office Visit    2 months ago [address] [facility], [address] [place], [place]    Office Visit     Future Appointments         Provider Department Appt Notes    In 2 months [facility] [employer], [place], [place] 5 month follow-up                    Recent Outpatient Visits              4 weeks ago Vitamin D deficiency    [facility], 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, [facility]    Office Visit          Future Appointments         Provider Department Appt Notes    In 2 months Abrahan Jones, [address], [address] [facility] 5 month follow-up

## 2022-09-22 ENCOUNTER — PATIENT MESSAGE (OUTPATIENT)
Dept: ENDOCRINOLOGY CLINIC | Facility: CLINIC | Age: 57
End: 2022-09-22

## 2022-09-22 DIAGNOSIS — Z01.812 ENCOUNTER FOR PREPROCEDURE SCREENING LABORATORY TESTING FOR COVID-19: ICD-10-CM

## 2022-09-22 DIAGNOSIS — E04.1 THYROID NODULE: Primary | ICD-10-CM

## 2022-09-22 DIAGNOSIS — Z20.822 ENCOUNTER FOR PREPROCEDURE SCREENING LABORATORY TESTING FOR COVID-19: ICD-10-CM

## 2022-09-22 NOTE — TELEPHONE ENCOUNTER
From: Carolin Whiting  To: Charles Michelle MD  Sent: 9/22/2022 10:23 AM CDT  Subject: Vitamin E script & Thyroid aspiration    Fiona Rudolph,  I have decided that I would like to have my thyroid cyst aspirated and tested as you suggested. I've been through it once before and understand that its minimally invasive but complicated with entering by the main artery. Also, the cyst may refill. I'd like to know of any other possible risks. I know that I've taken a long time to make this decision but if it could be scheduled before the end of the year, that would help with insurance. Can you advise to the next steps? Meet with you, get a referral, etc?     Also, I'm taking the prescribed Vitamin E but I stopped taking OTC daily Vitamin E, thinking that the script was a replacement. Is that correct or should I also be taking OTC vitamin D?     Thank you, Zachary Romero  [phone]

## 2022-10-28 NOTE — DISCHARGE INSTRUCTIONS
Procedure performed by Dr. Werner Guido of THYROID NODULE. DISCHARGE INSTRUCTIONS    Thyroid Biopsy: You may develop a sore throat after the procedure. If necessary,                               throat lozenges may be used to relieve the discomfort. Call your                               physician immediately if you experience increased swelling in your                                neck, difficulty breathing, or difficulty swallowing. Additional Discharge Instructions for all Biopsies:                               DO NOT TAKE aspirin-containing products, Ibuprofen, Vitamin E, or                              blood thinning products for three (3) days after the procedure. You may                             take Tylenol (1 or 2 tablets every 4-6 hours) for mild discomfort at the                             biopsy site. Rest quietly for 24 hours, no physical activity. Avoid                              straining, heavy lifting, or strenuous physical activity for approximately                             2 days. Report any bleeding at aspiration/biopsy site, redness,                             swelling, odor, discharge, pain or fever that does not lessen after one                              day, to your physician. Resume a regular diet. Call your physician with                             questions or test results. Also you may contact the Radiology Nurse                             at [phone] with any additional questions or concerns. Other: AVOID HOT LIQUIDS FOR 1 HR, ICE TO SITE IF NEEDED COMFORT. BRING THIS SHEET WITH YOU SHOULD YOU HAVE TO VISIT AN EMERGENCY ROOM OR SEE YOUR DOCTOR IN THE NEXT 24 HOURS.   THANK YOU, WE WISH YOU WELL

## 2022-10-30 ENCOUNTER — LAB ENCOUNTER (OUTPATIENT)
Dept: LAB | Facility: HOSPITAL | Age: 57
End: 2022-10-30
Attending: INTERNAL MEDICINE
Payer: COMMERCIAL

## 2022-10-30 DIAGNOSIS — Z01.812 ENCOUNTER FOR PREPROCEDURE SCREENING LABORATORY TESTING FOR COVID-19: ICD-10-CM

## 2022-10-30 DIAGNOSIS — Z20.822 ENCOUNTER FOR PREPROCEDURE SCREENING LABORATORY TESTING FOR COVID-19: ICD-10-CM

## 2022-10-30 LAB — SARS-COV-2 RNA RESP QL NAA+PROBE: NOT DETECTED

## 2022-11-02 ENCOUNTER — HOSPITAL ENCOUNTER (OUTPATIENT)
Dept: ULTRASOUND IMAGING | Facility: HOSPITAL | Age: 57
Discharge: HOME | End: 2022-11-02
Attending: INTERNAL MEDICINE
Payer: COMMERCIAL

## 2022-11-02 VITALS — SYSTOLIC BLOOD PRESSURE: 150 MMHG | DIASTOLIC BLOOD PRESSURE: 76 MMHG | HEART RATE: 76 BPM

## 2022-11-02 DIAGNOSIS — Z20.822 ENCOUNTER FOR PREPROCEDURE SCREENING LABORATORY TESTING FOR COVID-19: ICD-10-CM

## 2022-11-02 DIAGNOSIS — Z01.812 ENCOUNTER FOR PREPROCEDURE SCREENING LABORATORY TESTING FOR COVID-19: ICD-10-CM

## 2022-11-02 DIAGNOSIS — E04.1 THYROID NODULE: ICD-10-CM

## 2022-11-02 PROCEDURE — 88177 CYTP FNA EVAL EA ADDL: CPT | Performed by: INTERNAL MEDICINE

## 2022-11-02 PROCEDURE — 88172 CYTP DX EVAL FNA 1ST EA SITE: CPT | Performed by: INTERNAL MEDICINE

## 2022-11-02 PROCEDURE — 88173 CYTOPATH EVAL FNA REPORT: CPT | Performed by: INTERNAL MEDICINE

## 2022-11-02 PROCEDURE — 10005 FNA BX W/US GDN 1ST LES: CPT | Performed by: INTERNAL MEDICINE

## 2022-11-02 NOTE — IMAGING NOTE
56 Pt arrived to Radiology Holding, to ultrasound room #2 once room ready    1342 History taken and as follows: cyst present for 7 yrs, has been drained & biopsied in the past, result noncancerous. She saw an endocrinologist more recently, who recommended an aspiration/biopsy. Pt is more interested in the aspiration than the biopsy, but accepts that a biopsy will be done. 1350 Procedure explained questions answered. [phone] Consent verified and obtained      1410  Scans taken by Montserrat Arita, ultrasound  sonographer     8294  scans reviewed by athology  was  notified. Site marked 1420     1420  Time out taken      1421 Area cleaned sterile towels  to site. 1424  Lidocaine 1% 10 milligrams per ml  from kit was given 3 ml      FNA # 1 taken at  1424 with 22 g needle    FNA # 2 taken at 1425  with 22 g needle     FNA # 3 taken at 1426 with 22 g needle    FNA # 4 taken at 1429 with 22 g needle    FNA # 5 taken with at 1430 with 25 g needle    FNA #6 taken with at 1432 with 25 g needle    1434 Procedure completed area re scanned. Area cleaned band aid to site ice pack to site. 1440 Post instructions given  verbal et written with AVS summary sheet provided to patient. Also instructed patient to refrain from drinking or eating anything hot for several hours after biopsy  to prevent increase bleeding from occurring. 1448  Pt  discharged .

## 2022-11-04 ENCOUNTER — PATIENT MESSAGE (OUTPATIENT)
Dept: ENDOCRINOLOGY CLINIC | Facility: CLINIC | Age: 57
End: 2022-11-04

## 2022-11-05 NOTE — TELEPHONE ENCOUNTER
From: Anca Esparza  To: Linda Dent MD  Sent: 11/4/2022 2:43 PM CDT  Subject: thyroid aspiration and biopsy report    Dr Vesta Abdalla,  I reviewed the pathologists report from my biopsy and am happy about the benign results. However, it states the cyst is on the right side of the thyroid when in fact, it is on the left side. Both the nurse and radiologist referred to it (and verified with me) that it was the left side. Its a little thing, but since this is an ongoing situation that will continue to be monitored, I think its important that my my medical records be correct. Can you reach out to the pathologist and have the report corrected? Please let me know if this should be my responsibility and I will try to reach out to the pathologist.  Additionally, when should I have my vitamin D level re-tested?   Thank you again, Greyson Dumont

## 2022-11-05 NOTE — TELEPHONE ENCOUNTER
Please call pathology to request that they change the side of the lesion   Lesion is on the left, and report mentions right   Thanks

## 2022-11-08 NOTE — TELEPHONE ENCOUNTER
RN reviewed chart prior to calling radiology and it seems that report has been corrected. Update given to patient.

## 2022-11-16 ENCOUNTER — OFFICE VISIT (OUTPATIENT)
Dept: INTERNAL MEDICINE CLINIC | Facility: CLINIC | Age: 57
End: 2022-11-16
Payer: COMMERCIAL

## 2022-11-16 VITALS
BODY MASS INDEX: 38.49 KG/M2 | DIASTOLIC BLOOD PRESSURE: 95 MMHG | HEIGHT: 65 IN | SYSTOLIC BLOOD PRESSURE: 149 MMHG | WEIGHT: 231 LBS | HEART RATE: 72 BPM

## 2022-11-16 DIAGNOSIS — R39.9 URINARY TRACT INFECTION SYMPTOMS: Primary | ICD-10-CM

## 2022-11-16 DIAGNOSIS — Z23 NEED FOR VACCINATION: ICD-10-CM

## 2022-11-16 LAB
APPEARANCE: CLEAR
BILIRUBIN: NEGATIVE
GLUCOSE (URINE DIPSTICK): NEGATIVE MG/DL
KETONES (URINE DIPSTICK): NEGATIVE MG/DL
MULTISTIX LOT#: ABNORMAL NUMERIC
NITRITE, URINE: POSITIVE
PH, URINE: 6 (ref 4.5–8)
PROTEIN (URINE DIPSTICK): NEGATIVE MG/DL
SPECIFIC GRAVITY: 1.01 (ref 1–1.03)
URINE-COLOR: YELLOW
UROBILINOGEN,SEMI-QN: 0.2 MG/DL (ref 0–1.9)

## 2022-11-16 PROCEDURE — 99213 OFFICE O/P EST LOW 20 MIN: CPT | Performed by: INTERNAL MEDICINE

## 2022-11-16 PROCEDURE — 90471 IMMUNIZATION ADMIN: CPT | Performed by: INTERNAL MEDICINE

## 2022-11-16 PROCEDURE — 3080F DIAST BP >= 90 MM HG: CPT | Performed by: INTERNAL MEDICINE

## 2022-11-16 PROCEDURE — 90686 IIV4 VACC NO PRSV 0.5 ML IM: CPT | Performed by: INTERNAL MEDICINE

## 2022-11-16 PROCEDURE — 3008F BODY MASS INDEX DOCD: CPT | Performed by: INTERNAL MEDICINE

## 2022-11-16 PROCEDURE — 81002 URINALYSIS NONAUTO W/O SCOPE: CPT | Performed by: INTERNAL MEDICINE

## 2022-11-16 PROCEDURE — 3077F SYST BP >= 140 MM HG: CPT | Performed by: INTERNAL MEDICINE

## 2022-11-16 RX ORDER — SULFAMETHOXAZOLE AND TRIMETHOPRIM 800; 160 MG/1; MG/1
1 TABLET ORAL 2 TIMES DAILY
Qty: 14 TABLET | Refills: 0 | Status: SHIPPED | OUTPATIENT
Start: 2022-11-16 | End: 2022-11-23

## 2022-12-06 ENCOUNTER — OFFICE VISIT (OUTPATIENT)
Dept: INTERNAL MEDICINE CLINIC | Facility: CLINIC | Age: 57
End: 2022-12-06
Payer: COMMERCIAL

## 2022-12-06 VITALS
HEART RATE: 87 BPM | HEIGHT: 65 IN | BODY MASS INDEX: 38.15 KG/M2 | WEIGHT: 229 LBS | SYSTOLIC BLOOD PRESSURE: 130 MMHG | TEMPERATURE: 97 F | DIASTOLIC BLOOD PRESSURE: 85 MMHG

## 2022-12-06 DIAGNOSIS — Z00.00 ANNUAL PHYSICAL EXAM: Primary | ICD-10-CM

## 2022-12-06 DIAGNOSIS — E06.3 HASHIMOTO'S THYROIDITIS: ICD-10-CM

## 2022-12-06 DIAGNOSIS — Z12.11 COLON CANCER SCREENING: ICD-10-CM

## 2022-12-06 DIAGNOSIS — B35.1 ONYCHOMYCOSIS: ICD-10-CM

## 2022-12-06 DIAGNOSIS — Z23 NEED FOR VACCINATION: ICD-10-CM

## 2022-12-06 DIAGNOSIS — C44.99 DERMATOFIBROSARCOMA PROTUBERANS: ICD-10-CM

## 2022-12-06 DIAGNOSIS — E78.5 HYPERLIPIDEMIA, UNSPECIFIED HYPERLIPIDEMIA TYPE: ICD-10-CM

## 2022-12-06 PROBLEM — Z92.89: Status: RESOLVED | Noted: 2022-03-22 | Resolved: 2022-12-06

## 2022-12-06 PROCEDURE — 90750 HZV VACC RECOMBINANT IM: CPT | Performed by: INTERNAL MEDICINE

## 2022-12-06 PROCEDURE — 99396 PREV VISIT EST AGE 40-64: CPT | Performed by: INTERNAL MEDICINE

## 2022-12-06 PROCEDURE — 90472 IMMUNIZATION ADMIN EACH ADD: CPT | Performed by: INTERNAL MEDICINE

## 2022-12-06 PROCEDURE — 90677 PCV20 VACCINE IM: CPT | Performed by: INTERNAL MEDICINE

## 2022-12-06 PROCEDURE — 3079F DIAST BP 80-89 MM HG: CPT | Performed by: INTERNAL MEDICINE

## 2022-12-06 PROCEDURE — 90471 IMMUNIZATION ADMIN: CPT | Performed by: INTERNAL MEDICINE

## 2022-12-06 PROCEDURE — 3008F BODY MASS INDEX DOCD: CPT | Performed by: INTERNAL MEDICINE

## 2022-12-06 PROCEDURE — 3075F SYST BP GE 130 - 139MM HG: CPT | Performed by: INTERNAL MEDICINE

## 2022-12-06 RX ORDER — TERBINAFINE HYDROCHLORIDE 250 MG/1
250 TABLET ORAL DAILY
Qty: 90 TABLET | Refills: 0 | Status: SHIPPED | OUTPATIENT
Start: 2022-12-06

## 2022-12-09 ENCOUNTER — LAB ENCOUNTER (OUTPATIENT)
Dept: LAB | Age: 57
End: 2022-12-09
Attending: INTERNAL MEDICINE
Payer: COMMERCIAL

## 2022-12-09 DIAGNOSIS — E78.1 HYPERTRIGLYCERIDEMIA: ICD-10-CM

## 2022-12-09 DIAGNOSIS — D25.9 UTERINE LEIOMYOMA, UNSPECIFIED LOCATION: ICD-10-CM

## 2022-12-09 DIAGNOSIS — R53.82 CHRONIC FATIGUE: ICD-10-CM

## 2022-12-09 DIAGNOSIS — E06.3 HASHIMOTO'S THYROIDITIS: ICD-10-CM

## 2022-12-09 DIAGNOSIS — E55.9 VITAMIN D DEFICIENCY: ICD-10-CM

## 2022-12-09 DIAGNOSIS — K59.00 CONSTIPATION, UNSPECIFIED CONSTIPATION TYPE: ICD-10-CM

## 2022-12-09 DIAGNOSIS — E03.9 HYPOTHYROIDISM, UNSPECIFIED TYPE: ICD-10-CM

## 2022-12-09 LAB
ALBUMIN SERPL-MCNC: 3.8 G/DL (ref 3.4–5)
ALBUMIN/GLOB SERPL: 1.2 {RATIO} (ref 1–2)
ALP LIVER SERPL-CCNC: 85 U/L
ALT SERPL-CCNC: 26 U/L
ANION GAP SERPL CALC-SCNC: 5 MMOL/L (ref 0–18)
AST SERPL-CCNC: 19 U/L (ref 15–37)
BASOPHILS # BLD AUTO: 0.04 X10(3) UL (ref 0–0.2)
BASOPHILS NFR BLD AUTO: 0.7 %
BILIRUB SERPL-MCNC: 0.5 MG/DL (ref 0.1–2)
BUN BLD-MCNC: 17 MG/DL (ref 7–18)
BUN/CREAT SERPL: 28.3 (ref 10–20)
CALCIUM BLD-MCNC: 9.5 MG/DL (ref 8.5–10.1)
CHLORIDE SERPL-SCNC: 106 MMOL/L (ref 98–112)
CHOLEST SERPL-MCNC: 201 MG/DL (ref ?–200)
CO2 SERPL-SCNC: 29 MMOL/L (ref 21–32)
CREAT BLD-MCNC: 0.6 MG/DL
CRP SERPL HS-MCNC: 20.5 MG/L (ref ?–3)
DEPRECATED RDW RBC AUTO: 42.4 FL (ref 35.1–46.3)
EOSINOPHIL # BLD AUTO: 0.32 X10(3) UL (ref 0–0.7)
EOSINOPHIL NFR BLD AUTO: 6 %
ERYTHROCYTE [DISTWIDTH] IN BLOOD BY AUTOMATED COUNT: 12.2 % (ref 11–15)
EST. AVERAGE GLUCOSE BLD GHB EST-MCNC: 88 MG/DL (ref 68–126)
FASTING PATIENT LIPID ANSWER: YES
FASTING STATUS PATIENT QL REPORTED: YES
GFR SERPLBLD BASED ON 1.73 SQ M-ARVRAT: 106 ML/MIN/1.73M2 (ref 60–?)
GLOBULIN PLAS-MCNC: 3.3 G/DL (ref 2.8–4.4)
GLUCOSE BLD-MCNC: 99 MG/DL (ref 70–99)
HBA1C MFR BLD: 4.7 % (ref ?–5.7)
HCT VFR BLD AUTO: 41.4 %
HDLC SERPL-MCNC: 55 MG/DL (ref 40–59)
HGB BLD-MCNC: 13 G/DL
IMM GRANULOCYTES # BLD AUTO: 0.01 X10(3) UL (ref 0–1)
IMM GRANULOCYTES NFR BLD: 0.2 %
INSULIN SERPL-ACNC: 14 MU/L (ref 3–25)
LDLC SERPL CALC-MCNC: 127 MG/DL (ref ?–100)
LYMPHOCYTES # BLD AUTO: 1.39 X10(3) UL (ref 1–4)
LYMPHOCYTES NFR BLD AUTO: 26 %
MCH RBC QN AUTO: 29.8 PG (ref 26–34)
MCHC RBC AUTO-ENTMCNC: 31.4 G/DL (ref 31–37)
MCV RBC AUTO: 95 FL
MONOCYTES # BLD AUTO: 0.59 X10(3) UL (ref 0.1–1)
MONOCYTES NFR BLD AUTO: 11 %
NEUTROPHILS # BLD AUTO: 3 X10 (3) UL (ref 1.5–7.7)
NEUTROPHILS # BLD AUTO: 3 X10(3) UL (ref 1.5–7.7)
NEUTROPHILS NFR BLD AUTO: 56.1 %
NONHDLC SERPL-MCNC: 146 MG/DL (ref ?–130)
OSMOLALITY SERPL CALC.SUM OF ELEC: 292 MOSM/KG (ref 275–295)
PLATELET # BLD AUTO: 216 10(3)UL (ref 150–450)
POTASSIUM SERPL-SCNC: 4.3 MMOL/L (ref 3.5–5.1)
PROT SERPL-MCNC: 7.1 G/DL (ref 6.4–8.2)
RBC # BLD AUTO: 4.36 X10(6)UL
SODIUM SERPL-SCNC: 140 MMOL/L (ref 136–145)
T3FREE SERPL-MCNC: 2.4 PG/ML (ref 2.4–4.2)
T4 FREE SERPL-MCNC: 1.1 NG/DL (ref 0.8–1.7)
THYROPEROXIDASE AB SERPL-ACNC: 1802 U/ML (ref ?–60)
TRIGL SERPL-MCNC: 106 MG/DL (ref 30–149)
TSI SER-ACNC: 2.66 MIU/ML (ref 0.36–3.74)
VIT D+METAB SERPL-MCNC: 29.1 NG/ML (ref 30–100)
VLDLC SERPL CALC-MCNC: 19 MG/DL (ref 0–30)
WBC # BLD AUTO: 5.4 X10(3) UL (ref 4–11)

## 2022-12-09 PROCEDURE — 85025 COMPLETE CBC W/AUTO DIFF WBC: CPT

## 2022-12-09 PROCEDURE — 84443 ASSAY THYROID STIM HORMONE: CPT

## 2022-12-09 PROCEDURE — 82306 VITAMIN D 25 HYDROXY: CPT

## 2022-12-09 PROCEDURE — 36415 COLL VENOUS BLD VENIPUNCTURE: CPT

## 2022-12-09 PROCEDURE — 84482 T3 REVERSE: CPT

## 2022-12-09 PROCEDURE — 84481 FREE ASSAY (FT-3): CPT

## 2022-12-09 PROCEDURE — 86141 C-REACTIVE PROTEIN HS: CPT

## 2022-12-09 PROCEDURE — 83525 ASSAY OF INSULIN: CPT

## 2022-12-09 PROCEDURE — 80053 COMPREHEN METABOLIC PANEL: CPT

## 2022-12-09 PROCEDURE — 83036 HEMOGLOBIN GLYCOSYLATED A1C: CPT

## 2022-12-09 PROCEDURE — 84439 ASSAY OF FREE THYROXINE: CPT

## 2022-12-09 PROCEDURE — 86376 MICROSOMAL ANTIBODY EACH: CPT

## 2022-12-09 PROCEDURE — 80061 LIPID PANEL: CPT

## 2022-12-09 RX ORDER — ERGOCALCIFEROL (VITAMIN D2) 1250 MCG
CAPSULE ORAL
Qty: 5 CAPSULE | Refills: 0 | Status: SHIPPED | OUTPATIENT
Start: 2022-12-09 | End: 2023-03-09

## 2022-12-13 LAB — TRIIODOTHYRONINE, REVERSE: 13.7 NG/DL

## 2022-12-14 ENCOUNTER — OFFICE VISIT (OUTPATIENT)
Dept: INTEGRATIVE MEDICINE | Facility: CLINIC | Age: 57
End: 2022-12-14
Payer: COMMERCIAL

## 2022-12-14 VITALS
WEIGHT: 231.81 LBS | DIASTOLIC BLOOD PRESSURE: 80 MMHG | SYSTOLIC BLOOD PRESSURE: 124 MMHG | OXYGEN SATURATION: 97 % | HEART RATE: 98 BPM | BODY MASS INDEX: 39 KG/M2

## 2022-12-14 DIAGNOSIS — R79.82 ELEVATED C-REACTIVE PROTEIN (CRP): ICD-10-CM

## 2022-12-14 DIAGNOSIS — E06.3 HASHIMOTO'S THYROIDITIS: Primary | ICD-10-CM

## 2022-12-14 DIAGNOSIS — R53.82 CHRONIC FATIGUE: ICD-10-CM

## 2022-12-14 DIAGNOSIS — D25.9 UTERINE LEIOMYOMA, UNSPECIFIED LOCATION: ICD-10-CM

## 2022-12-14 PROCEDURE — 3074F SYST BP LT 130 MM HG: CPT | Performed by: FAMILY MEDICINE

## 2022-12-14 PROCEDURE — 99214 OFFICE O/P EST MOD 30 MIN: CPT | Performed by: FAMILY MEDICINE

## 2022-12-14 PROCEDURE — 3079F DIAST BP 80-89 MM HG: CPT | Performed by: FAMILY MEDICINE

## 2022-12-14 RX ORDER — LEVOTHYROXINE SODIUM 0.15 MG/1
150 TABLET ORAL
Qty: 30 TABLET | Refills: 1 | Status: SHIPPED | OUTPATIENT
Start: 2022-12-14 | End: 2022-12-15

## 2022-12-15 ENCOUNTER — TELEPHONE (OUTPATIENT)
Dept: INTEGRATIVE MEDICINE | Facility: CLINIC | Age: 57
End: 2022-12-15

## 2022-12-15 RX ORDER — LEVOTHYROXINE SODIUM 0.15 MG/1
TABLET ORAL
Qty: 90 TABLET | Refills: 0 | Status: SHIPPED | OUTPATIENT
Start: 2022-12-15

## 2022-12-21 ENCOUNTER — HOSPITAL ENCOUNTER (OUTPATIENT)
Dept: MAMMOGRAPHY | Facility: HOSPITAL | Age: 57
Discharge: HOME | End: 2022-12-21
Attending: OBSTETRICS & GYNECOLOGY
Payer: COMMERCIAL

## 2022-12-21 DIAGNOSIS — Z12.31 ENCOUNTER FOR SCREENING MAMMOGRAM FOR BREAST CANCER: ICD-10-CM

## 2022-12-21 PROCEDURE — 77063 BREAST TOMOSYNTHESIS BI: CPT | Performed by: OBSTETRICS & GYNECOLOGY

## 2022-12-21 PROCEDURE — 77067 SCR MAMMO BI INCL CAD: CPT | Performed by: OBSTETRICS & GYNECOLOGY

## 2023-02-02 ENCOUNTER — OFFICE VISIT (OUTPATIENT)
Dept: INTERNAL MEDICINE CLINIC | Facility: CLINIC | Age: 58
End: 2023-02-02

## 2023-02-02 VITALS
DIASTOLIC BLOOD PRESSURE: 84 MMHG | SYSTOLIC BLOOD PRESSURE: 128 MMHG | HEART RATE: 82 BPM | HEIGHT: 65 IN | WEIGHT: 232 LBS | BODY MASS INDEX: 38.65 KG/M2 | TEMPERATURE: 98 F | OXYGEN SATURATION: 97 %

## 2023-02-02 DIAGNOSIS — R09.81 SINUS CONGESTION: Primary | ICD-10-CM

## 2023-02-02 PROCEDURE — 3074F SYST BP LT 130 MM HG: CPT | Performed by: INTERNAL MEDICINE

## 2023-02-02 PROCEDURE — 3008F BODY MASS INDEX DOCD: CPT | Performed by: INTERNAL MEDICINE

## 2023-02-02 PROCEDURE — 99213 OFFICE O/P EST LOW 20 MIN: CPT | Performed by: INTERNAL MEDICINE

## 2023-02-02 PROCEDURE — 3079F DIAST BP 80-89 MM HG: CPT | Performed by: INTERNAL MEDICINE

## 2023-02-02 RX ORDER — AZITHROMYCIN 250 MG/1
TABLET, FILM COATED ORAL
Qty: 6 TABLET | Refills: 0 | Status: SHIPPED | OUTPATIENT
Start: 2023-02-02 | End: 2023-02-07

## 2023-03-07 RX ORDER — LEVOTHYROXINE SODIUM 0.12 MG/1
125 TABLET ORAL DAILY
Qty: 90 TABLET | Refills: 3 | Status: SHIPPED | OUTPATIENT
Start: 2023-03-07

## 2023-03-07 NOTE — TELEPHONE ENCOUNTER
Refill passed per [place] [facility], [facility] protocol    Requested Prescriptions   Pending Prescriptions Disp Refills    LEVOTHYROXINE 125 MCG Oral Tab [Pharmacy Med Name: LEVOTHYROXINE 0.125MG (125MCG) TAB] 90 tablet 1     Sig: TAKE 1 TABLET(125 MCG) BY MOUTH DAILY       Thyroid Medication Protocol Passed - 3/7/2023 12:39 PM        Passed - TSH in past 12 months        Passed - Last TSH value is normal     Lab Results   Component Value Date    TSH 2.660 12/09/2022                 Passed - In person appointment or virtual visit in the past 12 mos or appointment in next 3 mos     Recent Outpatient Visits              1 month ago Sinus congestion    Tianna Queen MD    Office Visit    2 months ago Hashimoto's thyroiditis    [address], [address] , [address] [place] [address] [place]    Office Visit    3 months ago Annual physical exam    Tianna Queen MD    Office Visit    3 months ago Urinary tract infection symptoms    Tianna Queen MD    Office Visit    6 months ago Vitamin D deficiency    Sirena Lux, Candido Del Real MD    Office Visit          Future Appointments         Provider Department Appt Notes    Tomorrow Steve Cartwright MD [address], [facility] full skin check    In 2 days [facility], [address] [facility] 2nd shingles shot     In 3 days Gen Gu MD [address], [address], [address] annual check-up    In 9 months Bravo Juares MD [address], [address] [facility] [place] [facility]

## 2023-03-08 ENCOUNTER — OFFICE VISIT (OUTPATIENT)
Dept: DERMATOLOGY CLINIC | Facility: CLINIC | Age: 58
End: 2023-03-08

## 2023-03-08 DIAGNOSIS — D22.9 MULTIPLE NEVI: Primary | ICD-10-CM

## 2023-03-08 DIAGNOSIS — D23.60 BENIGN NEOPLASM OF SKIN OF UPPER LIMB, INCLUDING SHOULDER, UNSPECIFIED LATERALITY: ICD-10-CM

## 2023-03-08 DIAGNOSIS — D23.4 BENIGN NEOPLASM OF SCALP AND SKIN OF NECK: ICD-10-CM

## 2023-03-08 DIAGNOSIS — L82.1 SEBORRHEIC KERATOSES: ICD-10-CM

## 2023-03-08 DIAGNOSIS — D18.01 CHERRY HEMANGIOMA: ICD-10-CM

## 2023-03-08 DIAGNOSIS — L40.0 PSORIASIS VULGARIS: ICD-10-CM

## 2023-03-08 DIAGNOSIS — C44.99 DERMATOFIBROSARCOMA PROTUBERANS: ICD-10-CM

## 2023-03-08 DIAGNOSIS — D23.30 BENIGN NEOPLASM OF SKIN OF FACE: ICD-10-CM

## 2023-03-08 DIAGNOSIS — D23.70 BENIGN NEOPLASM OF SKIN OF LOWER LIMB, INCLUDING HIP, UNSPECIFIED LATERALITY: ICD-10-CM

## 2023-03-08 DIAGNOSIS — D23.5 BENIGN NEOPLASM OF SKIN OF TRUNK, EXCEPT SCROTUM: ICD-10-CM

## 2023-03-09 ENCOUNTER — NURSE ONLY (OUTPATIENT)
Dept: INTERNAL MEDICINE CLINIC | Facility: CLINIC | Age: 58
End: 2023-03-09

## 2023-03-09 DIAGNOSIS — Z23 IMMUNIZATION DUE: Primary | ICD-10-CM

## 2023-03-09 PROCEDURE — 90471 IMMUNIZATION ADMIN: CPT | Performed by: INTERNAL MEDICINE

## 2023-03-09 PROCEDURE — 90750 HZV VACC RECOMBINANT IM: CPT | Performed by: INTERNAL MEDICINE

## 2023-03-09 NOTE — PROGRESS NOTES
Patient is here today for a scheduled nurse visit. Patient name, , and allergies verified. Order verified in system per PCP. Patient is to receive 2nd shingles injection. Patient received injection in left deltoid via IM injection. Patient tolerated injection well with no reaction noted. Patient advised to call the office if she has any questions.

## 2023-03-10 ENCOUNTER — OFFICE VISIT (OUTPATIENT)
Dept: OBGYN CLINIC | Facility: CLINIC | Age: 58
End: 2023-03-10

## 2023-03-10 VITALS
SYSTOLIC BLOOD PRESSURE: 143 MMHG | BODY MASS INDEX: 39.99 KG/M2 | HEART RATE: 93 BPM | WEIGHT: 240 LBS | DIASTOLIC BLOOD PRESSURE: 87 MMHG | HEIGHT: 65 IN

## 2023-03-10 DIAGNOSIS — Z01.419 ENCOUNTER FOR GYNECOLOGICAL EXAMINATION: Primary | ICD-10-CM

## 2023-03-10 DIAGNOSIS — Z12.31 ENCOUNTER FOR SCREENING MAMMOGRAM FOR BREAST CANCER: ICD-10-CM

## 2023-03-10 PROCEDURE — 3008F BODY MASS INDEX DOCD: CPT | Performed by: OBSTETRICS & GYNECOLOGY

## 2023-03-10 PROCEDURE — 99396 PREV VISIT EST AGE 40-64: CPT | Performed by: OBSTETRICS & GYNECOLOGY

## 2023-03-10 PROCEDURE — 3079F DIAST BP 80-89 MM HG: CPT | Performed by: OBSTETRICS & GYNECOLOGY

## 2023-03-10 PROCEDURE — 3077F SYST BP >= 140 MM HG: CPT | Performed by: OBSTETRICS & GYNECOLOGY

## 2023-03-15 ENCOUNTER — TELEPHONE (OUTPATIENT)
Dept: INTERNAL MEDICINE CLINIC | Facility: CLINIC | Age: 58
End: 2023-03-15

## 2023-03-15 DIAGNOSIS — Z12.11 SCREENING FOR COLON CANCER: Primary | ICD-10-CM

## 2023-06-02 ENCOUNTER — TELEPHONE (OUTPATIENT)
Dept: DERMATOLOGY CLINIC | Facility: CLINIC | Age: 58
End: 2023-06-02

## 2023-06-02 NOTE — TELEPHONE ENCOUNTER
Fax received from [facility] - office visit note from pt's recent visit with [facility]. Placed in KMT's office for review. Scanning sheet attached.

## 2023-11-10 ENCOUNTER — OFFICE VISIT (OUTPATIENT)
Dept: INTERNAL MEDICINE CLINIC | Facility: CLINIC | Age: 58
End: 2023-11-10
Payer: COMMERCIAL

## 2023-11-10 VITALS
HEIGHT: 65 IN | HEART RATE: 82 BPM | DIASTOLIC BLOOD PRESSURE: 80 MMHG | BODY MASS INDEX: 38.49 KG/M2 | OXYGEN SATURATION: 97 % | WEIGHT: 231 LBS | SYSTOLIC BLOOD PRESSURE: 116 MMHG | RESPIRATION RATE: 14 BRPM

## 2023-11-10 DIAGNOSIS — Z00.00 ANNUAL PHYSICAL EXAM: Primary | ICD-10-CM

## 2023-11-10 DIAGNOSIS — Z23 INFLUENZA VACCINE NEEDED: ICD-10-CM

## 2023-11-10 DIAGNOSIS — E04.9 GOITER: ICD-10-CM

## 2023-11-10 DIAGNOSIS — C44.99 DERMATOFIBROSARCOMA PROTUBERANS: ICD-10-CM

## 2023-11-10 DIAGNOSIS — E06.3 HASHIMOTO'S THYROIDITIS: ICD-10-CM

## 2023-11-10 DIAGNOSIS — R73.01 ELEVATED FASTING GLUCOSE: ICD-10-CM

## 2023-11-10 DIAGNOSIS — Z12.11 COLON CANCER SCREENING: ICD-10-CM

## 2023-11-10 DIAGNOSIS — E78.5 HYPERLIPIDEMIA, UNSPECIFIED HYPERLIPIDEMIA TYPE: ICD-10-CM

## 2023-11-10 DIAGNOSIS — Z23 NEED FOR VACCINATION: ICD-10-CM

## 2023-11-10 PROCEDURE — 3079F DIAST BP 80-89 MM HG: CPT | Performed by: INTERNAL MEDICINE

## 2023-11-10 PROCEDURE — 3008F BODY MASS INDEX DOCD: CPT | Performed by: INTERNAL MEDICINE

## 2023-11-10 PROCEDURE — 90686 IIV4 VACC NO PRSV 0.5 ML IM: CPT | Performed by: INTERNAL MEDICINE

## 2023-11-10 PROCEDURE — 99396 PREV VISIT EST AGE 40-64: CPT | Performed by: INTERNAL MEDICINE

## 2023-11-10 PROCEDURE — 90472 IMMUNIZATION ADMIN EACH ADD: CPT | Performed by: INTERNAL MEDICINE

## 2023-11-10 PROCEDURE — 3074F SYST BP LT 130 MM HG: CPT | Performed by: INTERNAL MEDICINE

## 2023-11-10 PROCEDURE — 90715 TDAP VACCINE 7 YRS/> IM: CPT | Performed by: INTERNAL MEDICINE

## 2023-11-10 PROCEDURE — 90471 IMMUNIZATION ADMIN: CPT | Performed by: INTERNAL MEDICINE

## 2023-11-17 ENCOUNTER — LAB ENCOUNTER (OUTPATIENT)
Dept: LAB | Age: 58
End: 2023-11-17
Attending: INTERNAL MEDICINE
Payer: COMMERCIAL

## 2023-11-17 ENCOUNTER — TELEPHONE (OUTPATIENT)
Dept: INTERNAL MEDICINE CLINIC | Facility: CLINIC | Age: 58
End: 2023-11-17

## 2023-11-17 DIAGNOSIS — E06.3 HASHIMOTO'S THYROIDITIS: ICD-10-CM

## 2023-11-17 DIAGNOSIS — R73.01 ELEVATED FASTING GLUCOSE: ICD-10-CM

## 2023-11-17 DIAGNOSIS — E04.9 GOITER: ICD-10-CM

## 2023-11-17 DIAGNOSIS — Z00.00 ANNUAL PHYSICAL EXAM: ICD-10-CM

## 2023-11-17 DIAGNOSIS — E78.5 HYPERLIPIDEMIA, UNSPECIFIED HYPERLIPIDEMIA TYPE: ICD-10-CM

## 2023-11-17 DIAGNOSIS — E55.9 VITAMIN D DEFICIENCY: Primary | ICD-10-CM

## 2023-11-17 LAB
ALBUMIN SERPL-MCNC: 4.5 G/DL (ref 3.2–4.8)
ALBUMIN/GLOB SERPL: 1.7 {RATIO} (ref 1–2)
ALP LIVER SERPL-CCNC: 89 U/L
ALT SERPL-CCNC: 20 U/L
ANION GAP SERPL CALC-SCNC: 6 MMOL/L (ref 0–18)
AST SERPL-CCNC: 28 U/L (ref ?–34)
BASOPHILS # BLD AUTO: 0.04 X10(3) UL (ref 0–0.2)
BASOPHILS NFR BLD AUTO: 0.8 %
BILIRUB SERPL-MCNC: 0.5 MG/DL (ref 0.3–1.2)
BUN BLD-MCNC: 11 MG/DL (ref 9–23)
BUN/CREAT SERPL: 15.1 (ref 10–20)
CALCIUM BLD-MCNC: 10 MG/DL (ref 8.7–10.4)
CHLORIDE SERPL-SCNC: 105 MMOL/L (ref 98–112)
CHOLEST SERPL-MCNC: 190 MG/DL (ref ?–200)
CO2 SERPL-SCNC: 28 MMOL/L (ref 21–32)
CREAT BLD-MCNC: 0.73 MG/DL
DEPRECATED RDW RBC AUTO: 40.1 FL (ref 35.1–46.3)
EGFRCR SERPLBLD CKD-EPI 2021: 96 ML/MIN/1.73M2 (ref 60–?)
EOSINOPHIL # BLD AUTO: 0.21 X10(3) UL (ref 0–0.7)
EOSINOPHIL NFR BLD AUTO: 4 %
ERYTHROCYTE [DISTWIDTH] IN BLOOD BY AUTOMATED COUNT: 12.1 % (ref 11–15)
EST. AVERAGE GLUCOSE BLD GHB EST-MCNC: 108 MG/DL (ref 68–126)
FASTING PATIENT LIPID ANSWER: YES
FASTING STATUS PATIENT QL REPORTED: YES
GLOBULIN PLAS-MCNC: 2.7 G/DL (ref 2.8–4.4)
GLUCOSE BLD-MCNC: 91 MG/DL (ref 70–99)
HBA1C MFR BLD: 5.4 % (ref ?–5.7)
HCT VFR BLD AUTO: 39.5 %
HDLC SERPL-MCNC: 43 MG/DL (ref 40–59)
HGB BLD-MCNC: 12.7 G/DL
IMM GRANULOCYTES # BLD AUTO: 0.01 X10(3) UL (ref 0–1)
IMM GRANULOCYTES NFR BLD: 0.2 %
LDLC SERPL CALC-MCNC: 126 MG/DL (ref ?–100)
LYMPHOCYTES # BLD AUTO: 1.56 X10(3) UL (ref 1–4)
LYMPHOCYTES NFR BLD AUTO: 29.9 %
MCH RBC QN AUTO: 28.9 PG (ref 26–34)
MCHC RBC AUTO-ENTMCNC: 32.2 G/DL (ref 31–37)
MCV RBC AUTO: 89.8 FL
MONOCYTES # BLD AUTO: 0.38 X10(3) UL (ref 0.1–1)
MONOCYTES NFR BLD AUTO: 7.3 %
NEUTROPHILS # BLD AUTO: 3.02 X10 (3) UL (ref 1.5–7.7)
NEUTROPHILS # BLD AUTO: 3.02 X10(3) UL (ref 1.5–7.7)
NEUTROPHILS NFR BLD AUTO: 57.8 %
NONHDLC SERPL-MCNC: 147 MG/DL (ref ?–130)
OSMOLALITY SERPL CALC.SUM OF ELEC: 287 MOSM/KG (ref 275–295)
PLATELET # BLD AUTO: 228 10(3)UL (ref 150–450)
POTASSIUM SERPL-SCNC: 4.1 MMOL/L (ref 3.5–5.1)
PROT SERPL-MCNC: 7.2 G/DL (ref 5.7–8.2)
RBC # BLD AUTO: 4.4 X10(6)UL
SODIUM SERPL-SCNC: 139 MMOL/L (ref 136–145)
T3FREE SERPL-MCNC: 3.14 PG/ML (ref 2.4–4.2)
T4 FREE SERPL-MCNC: 1.8 NG/DL (ref 0.8–1.7)
TRIGL SERPL-MCNC: 118 MG/DL (ref 30–149)
TSI SER-ACNC: 1.32 MIU/ML (ref 0.55–4.78)
VLDLC SERPL CALC-MCNC: 21 MG/DL (ref 0–30)
WBC # BLD AUTO: 5.2 X10(3) UL (ref 4–11)

## 2023-11-17 PROCEDURE — 84482 T3 REVERSE: CPT

## 2023-11-17 PROCEDURE — 83036 HEMOGLOBIN GLYCOSYLATED A1C: CPT

## 2023-11-17 PROCEDURE — 84481 FREE ASSAY (FT-3): CPT

## 2023-11-17 PROCEDURE — 80053 COMPREHEN METABOLIC PANEL: CPT

## 2023-11-17 PROCEDURE — 85025 COMPLETE CBC W/AUTO DIFF WBC: CPT

## 2023-11-17 PROCEDURE — 80061 LIPID PANEL: CPT

## 2023-11-17 PROCEDURE — 84443 ASSAY THYROID STIM HORMONE: CPT

## 2023-11-17 PROCEDURE — 82306 VITAMIN D 25 HYDROXY: CPT | Performed by: INTERNAL MEDICINE

## 2023-11-17 PROCEDURE — 36415 COLL VENOUS BLD VENIPUNCTURE: CPT

## 2023-11-17 PROCEDURE — 84439 ASSAY OF FREE THYROXINE: CPT

## 2023-11-17 NOTE — TELEPHONE ENCOUNTER
Patient gave her blood this morning, an extra vial for Vitamin D. Because Dr Lynn Rico forgot to order it. He has not ordered it but has not released the order to the lab. They need the order released from Dr Lynn Rico for Vitamin D asap. Call pt to advise her this has been done.

## 2023-11-17 NOTE — TELEPHONE ENCOUNTER
Patient is requesting the Vitamin D order be placed today and mentions she went to the lab and was advised to contact PCP as they took an extra vile and will hold until PCP calls in the order today. Please advise patient when confirmed.

## 2023-11-20 LAB — VIT D+METAB SERPL-MCNC: 41.3 NG/ML (ref 30–100)

## 2023-11-22 LAB — REVERSE T3: 23.3 NG/DL

## 2024-01-03 ENCOUNTER — OFFICE VISIT (OUTPATIENT)
Dept: DERMATOLOGY CLINIC | Facility: CLINIC | Age: 59
End: 2024-01-03
Payer: COMMERCIAL

## 2024-01-03 DIAGNOSIS — D23.60 BENIGN NEOPLASM OF SKIN OF UPPER LIMB, INCLUDING SHOULDER, UNSPECIFIED LATERALITY: ICD-10-CM

## 2024-01-03 DIAGNOSIS — C44.99 DERMATOFIBROSARCOMA PROTUBERANS: ICD-10-CM

## 2024-01-03 DIAGNOSIS — D23.5 BENIGN NEOPLASM OF SKIN OF TRUNK: ICD-10-CM

## 2024-01-03 DIAGNOSIS — L40.0 PSORIASIS VULGARIS: ICD-10-CM

## 2024-01-03 DIAGNOSIS — Z12.83 SKIN CANCER SCREENING: ICD-10-CM

## 2024-01-03 DIAGNOSIS — D23.4 BENIGN NEOPLASM OF SCALP AND SKIN OF NECK: ICD-10-CM

## 2024-01-03 DIAGNOSIS — D18.01 CHERRY HEMANGIOMA: ICD-10-CM

## 2024-01-03 DIAGNOSIS — D22.9 MULTIPLE NEVI: Primary | ICD-10-CM

## 2024-01-03 DIAGNOSIS — L82.1 SEBORRHEIC KERATOSES: ICD-10-CM

## 2024-01-03 DIAGNOSIS — D23.30 BENIGN NEOPLASM OF SKIN OF FACE: ICD-10-CM

## 2024-01-03 DIAGNOSIS — D23.70 BENIGN NEOPLASM OF SKIN OF LOWER LIMB, INCLUDING HIP, UNSPECIFIED LATERALITY: ICD-10-CM

## 2024-01-07 NOTE — PROGRESS NOTES
Dana Farmer is a 58 year old female.  HPI:     CC:    Chief Complaint   Patient presents with    Full Skin Exam     LOV 3/8/23; presenting today for full body skin exam.  Hx of Dermofibrosarcoma Protuberans.  Notes fungus of right second toe, had a topical prescribed by her PCP, but no improvement.           Allergies:  Gluten meal, Lactase, Dust mites, and Fentanyl    HISTORY:    Past Medical History:   Diagnosis Date    Dermatofibrosarcoma protuberans 2021    wide excision at [facility]     Fibroids     Hashimoto's thyroiditis     Hx of motion sickness     Ovarian cyst     per patient     PONV (postoperative nausea and vomiting)     Scalp lesion 2021    right parietal scalp, biopsy ([id number];  2021): 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)    Thyroid cyst       Past Surgical History:   Procedure Laterality Date    SKIN EXCISION Right 2021    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 wide excision with 2cm margins at [facility] see problem list for intial biopsy    TONSILLECTOMY  1982    TOTAL ABDOMINAL HYSTERECTOMY N/A 2022    Procedure: TOTAL ABDOMINAL HYSTERECTOMY- BILATERAL SALPINGO-OOPHORECTOMY;  Surgeon: Arely Choi MD;  Location: [facility] MAIN OR      Family History   Problem Relation Age of Onset    Skin cancer Self     Hypertension Mother     Heart Disorder Mother         Valve disease.  CHF age 72    Diabetes Mother     Stroke Mother     Breast Cancer Mother 65    Uterine Cancer Mother       Social History     Socioeconomic History    Marital status:    Tobacco Use    Smoking status: Never     Passive exposure: Never    Smokeless tobacco: Never   Vaping Use    Vaping Use: Never used   Substance and Sexual Activity    Alcohol use: Yes     Alcohol/week: 2.0 standard drinks of alcohol     Types: 2 Glasses of wine per week     Comment: 2 drinks weekly    Drug use: No     Sexual activity: Yes     Partners: Male   Other Topics Concern    Breast feeding No    Reaction to local anesthetic No    Special Diet Yes     Comment: Hashimotos diet     Pt has a pacemaker No    Pt has a defibrillator No   Social History Narrative    Work - , painting        Current Outpatient Medications   Medication Sig Dispense Refill    levothyroxine 125 MCG Oral Tab Take 1 tablet (125 mcg total) by mouth daily. 90 tablet 3    cholecalciferol 400 units/mL Oral Liquid Take 1 mL (400 Units total) by mouth daily.      ASHWAGANDHA OR Take 350 mg by mouth daily.      Multiple Vitamins-Iron (DAILY MULTIPLE VITAMIN/IRON OR) Take by mouth.      B Complex Vitamins (B COMPLEX OR) Take by mouth.      Magnesium 100 MG Oral Tab Take by mouth.       Allergies:   Allergies   Allergen Reactions    Gluten Meal UNKNOWN    Lactase UNKNOWN    Dust Mites Runny nose and ITCHING    Fentanyl NAUSEA ONLY       Past Medical History:   Diagnosis Date    Dermatofibrosarcoma protuberans 07/01/2021    wide excision at [facility]     Fibroids     Hashimoto's thyroiditis     Hx of motion sickness     Ovarian cyst     per patient     PONV (postoperative nausea and vomiting)     Scalp lesion 05/05/2021    right parietal scalp, biopsy ([id number];  4/5/2021): 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)    Thyroid cyst      Past Surgical History:   Procedure Laterality Date    SKIN EXCISION Right 07/01/2021    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 wide excision with 2cm margins at [facility] see problem list for intial biopsy    TONSILLECTOMY  1982    TOTAL ABDOMINAL HYSTERECTOMY N/A 06/06/2022    Procedure: TOTAL ABDOMINAL HYSTERECTOMY- BILATERAL SALPINGO-OOPHORECTOMY;  Surgeon: Arely Choi MD;  Location: [facility] MAIN OR     Social History     Socioeconomic History    Marital status:      Spouse name: Not on file    Number of children:  Not on file    Years of education: Not on file    Highest education level: Not on file   Occupational History    Not on file   Tobacco Use    Smoking status: Never     Passive exposure: Never    Smokeless tobacco: Never   Vaping Use    Vaping Use: Never used   Substance and Sexual Activity    Alcohol use: Yes     Alcohol/week: 2.0 standard drinks of alcohol     Types: 2 Glasses of wine per week     Comment: 2 drinks weekly    Drug use: No    Sexual activity: Yes     Partners: Male   Other Topics Concern    Grew up on a farm Not Asked    History of tanning Not Asked    Outdoor occupation Not Asked    Breast feeding No    Reaction to local anesthetic No    Caffeine Concern Not Asked    Exercise Not Asked    Seat Belt Not Asked    Special Diet Yes     Comment: Hashimotos diet     Stress Concern Not Asked    Weight Concern Not Asked    Pt has a pacemaker No    Pt has a defibrillator No   Social History Narrative    Work - , painting     Social Determinants of Health     Financial Resource Strain: Not on file   Food Insecurity: Not on file   Transportation Needs: Not on file   Physical Activity: Not on file   Stress: Not on file   Social Connections: Not on file   Housing Stability: Not on file     Family History   Problem Relation Age of Onset    Skin cancer Self     Hypertension Mother     Heart Disorder Mother         Valve disease.  CHF age 72    Diabetes Mother     Stroke Mother     Breast Cancer Mother 65    Uterine Cancer Mother        There were no vitals filed for this visit.    HPI:    Chief Complaint   Patient presents with    Full Skin Exam     LOV 3/8/23; presenting today for full body skin exam.  Hx of Dermofibrosarcoma Protuberans.  Notes fungus of right second toe, had a topical prescribed by her PCP, but no improvement.       Follow-up patient with history DFSP-had been followed by [facility]ology patient's oncologist has left, PA assigned who has left as well patient frustrated with this.   Complains of still numbness in the scar area. has been very careful with sun protection.  Has not noted any other problematic lesions.  Scalp very sensitive Notes nerve irritation with any light touch to this area.    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  Nothing else really new or different no recent travel generally feeling well  Personal history of skin cancer: DFSP post wide excision at [facility] and reconstruction at right parietal scalp continues to follow-up at [facility], no recurrence.  Has had small area that was more raised noted at last exam with oncology.      History of lots of sun exposure in the past.  Personal history of AK's: No  Personal history of dysplastic nevi: No  Family history of skin cancer: No      Patient presents with concerns above.    Patient has been in their usual state of health.  History, medications, allergies reviewed as noted.      ROS:  Denies any other systemic complaints.  No new or changeing lesions other than noted above. No fevers, chills, night sweats, unusual sun sensitivity.  No other skin complaints.        History, medications, allergies reviewed as noted.       Physical Examination:     Well-developed well-nourished patient alert oriented in no acute distress.  Exam total-body performed, including scalp, head, neck, face,nails, hair, external eyes, including conjunctival mucosa, eyelids, lips external ears, back, chest,/ breasts, axillae,  abdomen, arms, legs, palms.     Multiple light to medium brown, well marginated, uniformly pigmented, macules and papules 6 mm and less scattered on exam. pigmented lesions examined with dermoscopy benign-appearing patterns.     Waxy tannish keratotic papules scattered, cherry-red vascular papules scattered.    See map today's date for lesions noted .      Otherwise remarkable for lesions as noted on map.  See details of  examination  See Assessment /Plan for additional history and physical exam also:    Assessment / plan:    No orders of the defined types were placed in this encounter.      Meds & Refills for this Visit:  Requested Prescriptions      No prescriptions requested or ordered in this encounter         Encounter Diagnoses   Name Primary?    Multiple nevi Yes    Seborrheic keratoses     Cherry hemangioma     Benign neoplasm of skin of face     Benign neoplasm of scalp and skin of neck     Benign neoplasm of skin of upper limb, including shoulder, unspecified laterality     Benign neoplasm of skin of trunk     Benign neoplasm of skin of lower limb, including hip, unspecified laterality     Dermatofibrosarcoma protuberans     Skin cancer screening     Psoriasis vulgaris        See details on map.      Remarkable for:    History of DFSP-CD34 positive fibroblastic tumor-excised July 2021 right parietal scalp.  Post wide excision and flap.  hypertrophic area has flattened initial recommendations from oncology [facility] excised repaired.  Treated as if DFSP conservatively.  No evidence of recurrence nodularity, still tenderness, nerve pain within the scar noted  Area appears without evidence of recurrence.  Patient's oncologist had indicated skin exams, no additional monitoring labs or imaging studies at this time unless evidence of new metastatic lesion risk is very small per oncology.  has been doing well.  Itching likely result of the scar tissue nerve irritation, referred pain given flaps.  Overall this is likely to continue may continue to improve over time.  .  Numerous nevi, seborrheic keratoses very fair skin medium brown nevus at left flank unchanged continue careful monitoring follow-up every 6 months  Regular checks with PCP  Labs -no elevation in liver functions, renal function stable blood count stable again no imaging or more specific testing recommended by oncology  Ephelides along the anterior hairline sunscreen when  driving patient usually wears a hat when outdoors.    Nevi on back unchanged.  Numerous nevi lesions over lower abdomen in particular in left inframammary crease unchanged.  No new suspicious lesions.    Newer waxy tan papules on the back consistent benign seborrheic keratoses reassurance.  No other significant changes in exam few scattered lentigines    Follow-up ideally every 4 to 6 months in dermatology  Continue regular checkups with her oncologist at [facility]    Excoriated tan-brown papule at lower abdomen consistent with irritated keratosis reassurance consider trial of cryo.  Multiple keratoses nevi over the back.        Onychomycotic thickened nail, Kerasal.improving slowly  Pathophysiology discussed with patient.  Therapeutic options reviewed.  See  Medications in grid.  Instructions reviewed at length.    No other susupicious lesions on todays  exam.    Please refer to map for specific lesions.  See additional diagnoses.  Pros cons of various therapies, risks benefits discussed.Pathophysiology discussed with patient.  Therapeutic options reviewed.  See  Medications in grid.  Instructions reviewed at length.    Benign nevi, seborrheic  keratoses, cherry angiomas:  Reassurance regarding other benign skin lesions.Signs and symptoms of skin cancer, ABCDE's of melanoma discussed with patient. Sunscreen use, sun protection, self exams reviewed.  Followup as noted RTC routine checkup 6 mos - one year or p.r.n.    The patient indicates understanding of these issues and agrees to the plan.  The patient is asked to return as noted in follow-up/ above.    This note was generated using Dragon voice recognition software.  Please contact me regarding any confusion resulting from errors in recognition.  Encounter Times Including precharting, reviewing chart, prior notes obtaining history: 10 minutes, medical exam :10 minutes, notes on body map, plan, counseling 10minutes My total time spent caring for the patient on the day  of the encounter: 30 minutes    Note to patient and family: The 21st Century Cures Act makes medical notes like these available to patients. However, be advised this is a medical document. It is intended as [id number] communication and monitoring of a patient's care needs. It is written in medical language and may contain abbreviations or verbiage that are unfamiliar. It may appear blunt or direct. Medical documents are intended to carry relevant information, facts as evident and the clinical opinion of the practitioner.

## 2024-02-01 ENCOUNTER — HOSPITAL ENCOUNTER (OUTPATIENT)
Dept: MAMMOGRAPHY | Age: 59
Discharge: HOME | End: 2024-02-01
Attending: OBSTETRICS & GYNECOLOGY
Payer: COMMERCIAL

## 2024-02-01 DIAGNOSIS — Z12.31 ENCOUNTER FOR SCREENING MAMMOGRAM FOR BREAST CANCER: ICD-10-CM

## 2024-02-01 PROCEDURE — 77063 BREAST TOMOSYNTHESIS BI: CPT | Performed by: OBSTETRICS & GYNECOLOGY

## 2024-02-01 PROCEDURE — 77067 SCR MAMMO BI INCL CAD: CPT | Performed by: OBSTETRICS & GYNECOLOGY

## 2024-02-26 ENCOUNTER — OFFICE VISIT (OUTPATIENT)
Dept: OBGYN CLINIC | Facility: CLINIC | Age: 59
End: 2024-02-26

## 2024-02-26 VITALS
DIASTOLIC BLOOD PRESSURE: 89 MMHG | WEIGHT: 233 LBS | HEART RATE: 94 BPM | SYSTOLIC BLOOD PRESSURE: 143 MMHG | BODY MASS INDEX: 39 KG/M2

## 2024-02-26 DIAGNOSIS — Z01.419 ENCOUNTER FOR GYNECOLOGICAL EXAMINATION: Primary | ICD-10-CM

## 2024-02-26 PROCEDURE — 3077F SYST BP >= 140 MM HG: CPT | Performed by: OBSTETRICS & GYNECOLOGY

## 2024-02-26 PROCEDURE — 99396 PREV VISIT EST AGE 40-64: CPT | Performed by: OBSTETRICS & GYNECOLOGY

## 2024-02-26 PROCEDURE — 3079F DIAST BP 80-89 MM HG: CPT | Performed by: OBSTETRICS & GYNECOLOGY

## 2024-02-27 NOTE — PROGRESS NOTES
Dana Farmer is a 59 year old female  Patient's last menstrual period was 2021 (approximate). here for annual exam.       Last seen 3/10/2023.   Last pap 2022 normal with neg HPV    Last mammogram 2024.      Had TOMASZ/BSO on 22 for fibroids.  She had 23 cm fibroid arising from left uterine side wall extending into broad ligament.  Pathology--benign.  22 cm fibroid.       Followed for thyroid nodule    OBSTETRICS HISTORY:  OB History    Para Term  AB Living   0 0 0 0 0 0   SAB IAB Ectopic Multiple Live Births   0 0 0 0 0       GYNE HISTORY   Pap Date: 02/15/22  Pap Result Notes: pap hpv neg  Follow Up Recommendation: 24 bilat neg    MEDICAL HISTORY:  Past Medical History:   Diagnosis Date    Dermatofibrosarcoma protuberans 2021    wide excision at [facility]     Fibroids     Hashimoto's thyroiditis     Hx of motion sickness     Ovarian cyst     per patient     PONV (postoperative nausea and vomiting)     Scalp lesion 2021    right parietal scalp, biopsy ([id number];  2021): 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)    Thyroid cyst      Past Surgical History:   Procedure Laterality Date    SKIN EXCISION Right 2021    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 wide excision with 2cm margins at [facility] see problem list for intial biopsy    TONSILLECTOMY      TOTAL ABDOMINAL HYSTERECTOMY N/A 2022    Procedure: TOTAL ABDOMINAL HYSTERECTOMY- BILATERAL SALPINGO-OOPHORECTOMY;  Surgeon: Arely Choi MD;  Location: [facility] MAIN OR       SOCIAL HISTORY:  Social History     Socioeconomic History    Marital status:    Tobacco Use    Smoking status: Never     Passive exposure: Never    Smokeless tobacco: Never   Vaping Use    Vaping Use: Never used   Substance and Sexual Activity    Alcohol use: Yes     Alcohol/week: 2.0 standard drinks of alcohol     Types: 2  Glasses of wine per week     Comment: 2 drinks weekly    Drug use: No    Sexual activity: Yes     Partners: Male     Birth control/protection: Hysterectomy   Other Topics Concern    Breast feeding No    Reaction to local anesthetic No    Special Diet Yes     Comment: Hashimotos diet     Pt has a pacemaker No    Pt has a defibrillator No   Social History Narrative    Work - , painting       FAMILY HISTORY:  Family History   Problem Relation Age of Onset    Skin cancer Self     Hypertension Mother     Heart Disorder Mother         Valve disease.  CHF age 72    Diabetes Mother     Stroke Mother     Breast Cancer Mother 65    Uterine Cancer Mother        MEDICATIONS:  Current Outpatient Medications   Medication Sig Dispense Refill    cholecalciferol 400 units/mL Oral Liquid Take 1 mL (400 Units total) by mouth daily.      levothyroxine 125 MCG Oral Tab Take 1 tablet (125 mcg total) by mouth daily. 90 tablet 3    ASHWAGANDHA OR Take 350 mg by mouth daily.      Multiple Vitamins-Iron (DAILY MULTIPLE VITAMIN/IRON OR) Take by mouth.      B Complex Vitamins (B COMPLEX OR) Take by mouth.      Magnesium 100 MG Oral Tab Take by mouth.         ALLERGIES:    Allergies   Allergen Reactions    Gluten Meal UNKNOWN    Lactase UNKNOWN    Dust Mites Runny nose and ITCHING    Fentanyl NAUSEA ONLY         Depression Screening (PHQ-2/PHQ-9): Over the LAST 2 WEEKS   Little interest or pleasure in doing things (over the last two weeks)?: Not at all    Feeling down, depressed, or hopeless (over the last two weeks)?: Not at all    PHQ-2 SCORE: 0           Review of Systems:  Constitutional:  Denies fatigue, night sweats, hot flashes  Eyes:  denies blurred or double vision  Cardiovascular:  denies chest pain or palpitations  Respiratory:  denies shortness of breath  Gastrointestinal:  denies heartburn, abdominal pain, diarrhea or constipation  Genitourinary:  denies dysuria, incontinence, abnormal vaginal discharge, vaginal  itching  Musculoskeletal:  denies back pain.  Skin/Breast:  Denies any breast pain, lumps, or discharge.   Neurological:  denies headaches, extremity weakness or numbness.  Psychiatric: denies depression or anxiety.  Endocrine:   denies excessive thirst or urination.  Heme/Lymph:  easy bruising or bleeding.    PHYSICAL EXAM:   /89   Pulse 94   Wt 233 lb (105.7 kg)   LMP 11/01/2021 (Approximate)   BMI 38.77 kg/m²   Wt Readings from Last 2 Encounters:   02/26/24 233 lb (105.7 kg)   11/10/23 231 lb (104.8 kg)     Body mass index is 38.77 kg/m².    Constitutional: well developed, well nourished  Neck/Thyroid: thyroid symmetric, large left thyroid nodule  Heart:  Regular rate and rhythm  Lungs:  Clear to asculation  Breast: normal without palpable masses, tenderness, asymmetry, nipple discharge, nipple retraction or skin changes  Abdomen:  soft, nontender, nondistended, no masses  Psychiatric:  Oriented to time, place, person and situation. Appropriate mood and affect    Pelvic Exam:  External Genitalia: normal appearance, hair distribution, and no lesions  Urethral Meatus:  normal in size, location, without lesions and prolapse  Bladder:  No fullness, masses or tenderness  Vagina:  Normal appearance without lesions, no abnormal discharge  Uterus surgically removed.  No pelvic masses.      Assessment & Plan:    Dana Farmer is a 59 year old female who presents for an annual physical exam.    1. Encounter for gynecological examination  Pap not done.  ASCCP guidelines reviewed.   Encouraged annual exam.   RTC 1 year or prn         Requested Prescriptions      No prescriptions requested or ordered in this encounter         Arely Choi MD  2/27/2024  3:49 PM

## 2024-02-28 RX ORDER — LEVOTHYROXINE SODIUM 0.12 MG/1
125 TABLET ORAL DAILY
Qty: 90 TABLET | Refills: 3 | Status: SHIPPED | OUTPATIENT
Start: 2024-02-28

## 2024-02-28 NOTE — TELEPHONE ENCOUNTER
Refill passed per [facility] protocol.  Requested Prescriptions   Pending Prescriptions Disp Refills    LEVOTHYROXINE 125 MCG Oral Tab [Pharmacy Med Name: LEVOTHYROXINE 0.125MG (125MCG) TAB] 90 tablet 3     Sig: TAKE 1 TABLET(125 MCG) BY MOUTH DAILY       Thyroid Medication Protocol Passed - 2/27/2024  1:22 PM        Passed - TSH in past 12 months        Passed - Last TSH value is normal     Lab Results   Component Value Date    TSH 1.325 11/17/2023                 Passed - In person appointment or virtual visit in the past 12 mos or appointment in next 3 mos     Recent Outpatient Visits              2 days ago [facility]    [facility]    [facility]    [facility] Visit    3 months ago Annual physical exam    [facility] Stephen Og MD    Office Visit    11 months ago [place]    [place] Visit    11 months ago Immunization due    [facility] [place]    Nurse Only                         Recent Outpatient Visits              2 days ago Encounter for gynecological examination    [facility] Kim, Julie, MD    Office Visit    [facility]    [facility] Visit    3 months ago [facility] physical [facility]    [facility]    11 months ago En[place] for gynecological examination    [place] [place]    Office Visit    11 months ago Immunization due    [facility], [address], [place]    Nurse [place]

## 2024-03-29 ENCOUNTER — LAB ENCOUNTER (OUTPATIENT)
Dept: LAB | Age: 59
End: 2024-03-29
Attending: INTERNAL MEDICINE
Payer: COMMERCIAL

## 2024-03-29 DIAGNOSIS — Z12.11 SCREENING FOR COLON CANCER: ICD-10-CM

## 2024-03-29 LAB — HEMOCCULT STL QL: NEGATIVE

## 2024-03-29 PROCEDURE — 82274 ASSAY TEST FOR BLOOD FECAL: CPT

## 2024-06-08 ENCOUNTER — OFFICE VISIT (OUTPATIENT)
Dept: INTERNAL MEDICINE CLINIC | Facility: CLINIC | Age: 59
End: 2024-06-08

## 2024-06-08 VITALS
DIASTOLIC BLOOD PRESSURE: 85 MMHG | RESPIRATION RATE: 16 BRPM | HEIGHT: 65 IN | SYSTOLIC BLOOD PRESSURE: 134 MMHG | HEART RATE: 78 BPM | BODY MASS INDEX: 40.32 KG/M2 | WEIGHT: 242 LBS

## 2024-06-08 DIAGNOSIS — R35.0 FREQUENCY OF URINATION: Primary | ICD-10-CM

## 2024-06-08 LAB
BILIRUBIN: NEGATIVE
GLUCOSE (URINE DIPSTICK): NEGATIVE MG/DL
KETONES (URINE DIPSTICK): NEGATIVE MG/DL
MULTISTIX LOT#: ABNORMAL NUMERIC
NITRITE, URINE: NEGATIVE
PH, URINE: 6 (ref 4.5–8)
PROTEIN (URINE DIPSTICK): NEGATIVE MG/DL
SPECIFIC GRAVITY: 1.01 (ref 1–1.03)
URINE-COLOR: YELLOW
UROBILINOGEN,SEMI-QN: 0.2 MG/DL (ref 0–1.9)

## 2024-06-08 PROCEDURE — 3008F BODY MASS INDEX DOCD: CPT | Performed by: NURSE PRACTITIONER

## 2024-06-08 PROCEDURE — 3075F SYST BP GE 130 - 139MM HG: CPT | Performed by: NURSE PRACTITIONER

## 2024-06-08 PROCEDURE — 81002 URINALYSIS NONAUTO W/O SCOPE: CPT | Performed by: NURSE PRACTITIONER

## 2024-06-08 PROCEDURE — 3079F DIAST BP 80-89 MM HG: CPT | Performed by: NURSE PRACTITIONER

## 2024-06-08 PROCEDURE — 99213 OFFICE O/P EST LOW 20 MIN: CPT | Performed by: NURSE PRACTITIONER

## 2024-06-08 RX ORDER — CEPHALEXIN 500 MG/1
500 CAPSULE ORAL 2 TIMES DAILY
Qty: 14 CAPSULE | Refills: 0 | Status: SHIPPED | OUTPATIENT
Start: 2024-06-08 | End: 2024-06-15

## 2024-06-08 NOTE — PROGRESS NOTES
Dana Farmer is a 59 year old female.  Chief Complaint   Patient presents with    Urinary Frequency     HPI:   She presents with urinary urgency. She denies any dysuria or frequency. Her symptoms started on Thursday. She states her symptoms increased last night. She did take an AZO last night for her symptoms. She denies any fevers, back pain or abdominal pain.     She had a previous UTI in 11/2022. She was treated with Bactrim.   Current Outpatient Medications   Medication Sig Dispense Refill    cephalexin 500 MG Oral Cap Take 1 capsule (500 mg total) by mouth 2 (two) times daily for 7 days. 14 capsule 0    levothyroxine 125 MCG Oral Tab Take 1 tablet (125 mcg total) by mouth daily. 90 tablet 3    cholecalciferol 400 units/mL Oral Liquid Take 1 mL (400 Units total) by mouth daily.      ASHWAGANDHA OR Take 350 mg by mouth daily.      Multiple Vitamins-Iron (DAILY MULTIPLE VITAMIN/IRON OR) Take by mouth.      B Complex Vitamins (B COMPLEX OR) Take by mouth.        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 wide excision with 2cm margins at [facility] see problem list for intial biopsy    Tonsillectomy  1982    Total abdominal hysterectomy N/A 06/06/2022    Procedure: TOTAL ABDOMINAL HYSTERECTOMY- BILATERAL SALPINGO-OOPHORECTOMY;  Surgeon: Arely Choi MD;  Location: [facility] MAIN OR      Social History:  Social History     Socioeconomic History    Marital status:    Tobacco  Use    Smoking status: Never     Passive exposure: Never    Smokeless tobacco: Never   Vaping Use    Vaping status: Never Used   Substance and Sexual Activity    Alcohol use: Yes     Alcohol/week: 2.0 standard drinks of alcohol     Types: 2 Glasses of wine per week     Comment: 2 drinks weekly    Drug use: No    Sexual activity: Yes     Partners: Male     Birth control/protection: Hysterectomy   Other Topics Concern    Breast feeding No    Reaction to local anesthetic No    Special Diet Yes     Comment: Hashimotos diet     Pt has a pacemaker No    Pt has a defibrillator No   Social History Narrative    Work - , painting     Social Determinants of Health      Received from [facility], [facility]    Social Connections    Received from [facility], [facility]    Housing Stability      Family History   Problem Relation Age of Onset    Skin cancer Self     Hypertension Mother     Heart Disorder Mother         Valve disease.  CHF age 72    Diabetes Mother     Stroke Mother     Breast Cancer Mother 65    Uterine Cancer Mother       Allergies   Allergen Reactions    Gluten Meal UNKNOWN    Lactase UNKNOWN    Dust Mites Runny nose and ITCHING    Fentanyl NAUSEA ONLY        REVIEW OF SYSTEMS:     Review of Systems   Constitutional:  Negative for fever.   HENT: Negative.     Respiratory:  Negative for cough, shortness of breath and wheezing.    Cardiovascular:  Negative for chest pain.   Gastrointestinal:  Negative for abdominal pain.   Genitourinary:  Positive for urgency.   Musculoskeletal: Negative.    Skin: Negative.    Neurological: Negative.    Psychiatric/Behavioral: Negative.        Wt Readings from Last 5 Encounters:   24 242 lb (109.8 kg)   24 233 lb (105.7 kg)   11/10/23 231 lb (104.8 kg)   03/10/23 240 lb (108.9 kg)   23 232 lb (105.2 kg)     Body mass index is 40.27 kg/m².      EXAM:   /85   Pulse  78   Resp 16   Ht 5' 5\" (1.651 m)   Wt 242 lb (109.8 kg)   LMP 11/01/2021 (Approximate)   BMI 40.27 kg/m²     Physical Exam  Vitals reviewed.   Constitutional:       Appearance: Normal appearance.   HENT:      Head: Normocephalic.   Cardiovascular:      Rate and Rhythm: Normal rate and regular rhythm.      Pulses: Normal pulses.   Pulmonary:      Breath sounds: Normal breath sounds. No wheezing.   Musculoskeletal:         General: No swelling. Normal range of motion.   Skin:     General: Skin is warm and dry.   Neurological:      Mental Status: She is alert and oriented to person, place, and time.   Psychiatric:         Mood and Affect: Mood normal.         Behavior: Behavior normal.            ASSESSMENT AND PLAN:   1. Frequency of urination  - POC Urinalysis, Manual Dip without microscopy [90440]  - Urine Culture, Routine [E]; Future  - cephalexin 500 MG Oral Cap; Take 1 capsule (500 mg total) by mouth 2 (two) times daily for 7 days.  Dispense: 14 capsule; Refill: 0  - Urine Culture, Routine [E]      The patient indicates understanding of these issues and agrees to the plan.  Return for if symptoms do not resolve.

## 2024-10-29 DIAGNOSIS — Z12.11 SCREENING FOR COLON CANCER: Primary | ICD-10-CM

## 2025-03-27 RX ORDER — LEVOTHYROXINE SODIUM 125 UG/1
125 TABLET ORAL DAILY
Qty: 90 TABLET | Refills: 0 | Status: SHIPPED | OUTPATIENT
Start: 2025-03-27

## 2025-03-27 RX ORDER — LEVOTHYROXINE SODIUM 125 UG/1
125 TABLET ORAL DAILY
Qty: 90 TABLET | Refills: 0 | OUTPATIENT
Start: 2025-03-27

## 2025-03-27 NOTE — TELEPHONE ENCOUNTER
Please Review. Protocol Failed; No Protocol   No Active/ Future labs pended  Mycharted patient to schedule an appointment.   Sent to Patient  to call patient to schedule an appointment

## 2025-03-27 NOTE — TELEPHONE ENCOUNTER
Patient scheduled April 11 in a 30-minute res 24 slot with Dr Harrison.    Patient said she has 18 days of pills left.

## 2025-03-27 NOTE — TELEPHONE ENCOUNTER
Patient scheduled physical for next available which is may. Did not want to scheduled with aprn. Is upset he is scheduling that far out and wants her refills.

## 2025-03-31 RX ORDER — LEVOTHYROXINE SODIUM 125 UG/1
125 TABLET ORAL DAILY
Qty: 90 TABLET | Refills: 3 | OUTPATIENT
Start: 2025-03-31

## 2025-03-31 NOTE — TELEPHONE ENCOUNTER
This Medication Technician is calling [facility].  Medication: levothyroxine 125 mcg  Reason for call: per patient - pharmacy states that our office denied her levothyroxine refill. We show prescription was sent 3/27/25 for a 90 day supply.  Spoke with: jason Marmolejo   End result: per Jaleel, pharmacist - pharmacy filled 3/27/25 and patient picked up 3/292/25    Medication Quantity Refills Start End   levothyroxine 125 MCG Oral Tab 90 tablet 0 3/27/2025 --   Sig:   Take 1 tablet (125 mcg total) by mouth daily.     Route:   Oral     E-Prescribing Status: Receipt confirmed by pharmacy (3/27/2025  5:13 PM CDT)        [facility] #[id number] - [place], [place] - [address] AT [facility], [phone], [phone]

## 2025-04-11 ENCOUNTER — LAB ENCOUNTER (OUTPATIENT)
Dept: LAB | Age: 60
End: 2025-04-11
Attending: INTERNAL MEDICINE
Payer: COMMERCIAL

## 2025-04-11 ENCOUNTER — OFFICE VISIT (OUTPATIENT)
Dept: INTERNAL MEDICINE CLINIC | Facility: CLINIC | Age: 60
End: 2025-04-11

## 2025-04-11 VITALS
HEIGHT: 65 IN | TEMPERATURE: 98 F | OXYGEN SATURATION: 97 % | DIASTOLIC BLOOD PRESSURE: 84 MMHG | WEIGHT: 241.81 LBS | HEART RATE: 82 BPM | BODY MASS INDEX: 40.29 KG/M2 | SYSTOLIC BLOOD PRESSURE: 138 MMHG | RESPIRATION RATE: 20 BRPM

## 2025-04-11 DIAGNOSIS — E78.5 HYPERLIPIDEMIA, UNSPECIFIED HYPERLIPIDEMIA TYPE: ICD-10-CM

## 2025-04-11 DIAGNOSIS — E06.3 HASHIMOTO'S THYROIDITIS: ICD-10-CM

## 2025-04-11 DIAGNOSIS — Z12.11 COLON CANCER SCREENING: ICD-10-CM

## 2025-04-11 DIAGNOSIS — Z00.00 ANNUAL PHYSICAL EXAM: ICD-10-CM

## 2025-04-11 DIAGNOSIS — Z12.31 VISIT FOR SCREENING MAMMOGRAM: ICD-10-CM

## 2025-04-11 DIAGNOSIS — C44.99 DERMATOFIBROSARCOMA PROTUBERANS: ICD-10-CM

## 2025-04-11 DIAGNOSIS — Z00.00 ANNUAL PHYSICAL EXAM: Primary | ICD-10-CM

## 2025-04-11 LAB
ALBUMIN SERPL-MCNC: 5 G/DL (ref 3.2–4.8)
ALBUMIN/GLOB SERPL: 2 {RATIO} (ref 1–2)
ALP LIVER SERPL-CCNC: 92 U/L (ref 46–118)
ALT SERPL-CCNC: 22 U/L (ref 10–49)
ANION GAP SERPL CALC-SCNC: 8 MMOL/L (ref 0–18)
AST SERPL-CCNC: 31 U/L (ref ?–34)
BASOPHILS # BLD AUTO: 0.06 X10(3) UL (ref 0–0.2)
BASOPHILS NFR BLD AUTO: 0.8 %
BILIRUB SERPL-MCNC: 0.3 MG/DL (ref 0.3–1.2)
BUN BLD-MCNC: 13 MG/DL (ref 9–23)
BUN/CREAT SERPL: 18.1 (ref 10–20)
CALCIUM BLD-MCNC: 9.6 MG/DL (ref 8.7–10.4)
CHLORIDE SERPL-SCNC: 104 MMOL/L (ref 98–112)
CHOLEST SERPL-MCNC: 194 MG/DL (ref ?–200)
CO2 SERPL-SCNC: 28 MMOL/L (ref 21–32)
CREAT BLD-MCNC: 0.72 MG/DL (ref 0.55–1.02)
DEPRECATED RDW RBC AUTO: 40.3 FL (ref 35.1–46.3)
EGFRCR SERPLBLD CKD-EPI 2021: 97 ML/MIN/1.73M2 (ref 60–?)
EOSINOPHIL # BLD AUTO: 0.18 X10(3) UL (ref 0–0.7)
EOSINOPHIL NFR BLD AUTO: 2.5 %
ERYTHROCYTE [DISTWIDTH] IN BLOOD BY AUTOMATED COUNT: 12.3 % (ref 11–15)
FASTING PATIENT LIPID ANSWER: NO
FASTING STATUS PATIENT QL REPORTED: NO
GLOBULIN PLAS-MCNC: 2.5 G/DL (ref 2–3.5)
GLUCOSE BLD-MCNC: 96 MG/DL (ref 70–99)
HCT VFR BLD AUTO: 41.4 % (ref 35–48)
HDLC SERPL-MCNC: 43 MG/DL (ref 40–59)
HGB BLD-MCNC: 13.1 G/DL (ref 12–16)
IMM GRANULOCYTES # BLD AUTO: 0.02 X10(3) UL (ref 0–1)
IMM GRANULOCYTES NFR BLD: 0.3 %
LDLC SERPL CALC-MCNC: 114 MG/DL (ref ?–100)
LYMPHOCYTES # BLD AUTO: 2.21 X10(3) UL (ref 1–4)
LYMPHOCYTES NFR BLD AUTO: 30.1 %
MCH RBC QN AUTO: 28.2 PG (ref 26–34)
MCHC RBC AUTO-ENTMCNC: 31.6 G/DL (ref 31–37)
MCV RBC AUTO: 89.2 FL (ref 80–100)
MONOCYTES # BLD AUTO: 0.54 X10(3) UL (ref 0.1–1)
MONOCYTES NFR BLD AUTO: 7.4 %
NEUTROPHILS # BLD AUTO: 4.33 X10 (3) UL (ref 1.5–7.7)
NEUTROPHILS # BLD AUTO: 4.33 X10(3) UL (ref 1.5–7.7)
NEUTROPHILS NFR BLD AUTO: 58.9 %
NONHDLC SERPL-MCNC: 151 MG/DL (ref ?–130)
OSMOLALITY SERPL CALC.SUM OF ELEC: 290 MOSM/KG (ref 275–295)
PLATELET # BLD AUTO: 260 10(3)UL (ref 150–450)
POTASSIUM SERPL-SCNC: 4 MMOL/L (ref 3.5–5.1)
PROT SERPL-MCNC: 7.5 G/DL (ref 5.7–8.2)
RBC # BLD AUTO: 4.64 X10(6)UL (ref 3.8–5.3)
SODIUM SERPL-SCNC: 140 MMOL/L (ref 136–145)
T3FREE SERPL-MCNC: 3.01 PG/ML (ref 2.4–4.2)
T4 FREE SERPL-MCNC: 1.4 NG/DL (ref 0.8–1.7)
TRIGL SERPL-MCNC: 215 MG/DL (ref 30–149)
TSI SER-ACNC: 0.38 UIU/ML (ref 0.55–4.78)
VLDLC SERPL CALC-MCNC: 37 MG/DL (ref 0–30)
WBC # BLD AUTO: 7.3 X10(3) UL (ref 4–11)

## 2025-04-11 PROCEDURE — 80061 LIPID PANEL: CPT

## 2025-04-11 PROCEDURE — 80053 COMPREHEN METABOLIC PANEL: CPT

## 2025-04-11 PROCEDURE — 85025 COMPLETE CBC W/AUTO DIFF WBC: CPT

## 2025-04-11 PROCEDURE — 84443 ASSAY THYROID STIM HORMONE: CPT

## 2025-04-11 PROCEDURE — 36415 COLL VENOUS BLD VENIPUNCTURE: CPT

## 2025-04-11 PROCEDURE — 84481 FREE ASSAY (FT-3): CPT

## 2025-04-11 PROCEDURE — 84439 ASSAY OF FREE THYROXINE: CPT

## 2025-04-11 PROCEDURE — 99396 PREV VISIT EST AGE 40-64: CPT | Performed by: INTERNAL MEDICINE

## 2025-04-11 NOTE — PROGRESS NOTES
Patient ID: Dana Farmer is a 60 year old female.  Chief Complaint   Patient presents with    Physical     Annual physical        HISTORY OF PRESENT ILLNESS:   HPI  Patient presents for above.  Here for her annual physical.    History of Hashimoto's thyroiditis with goiter.  Had negative biopsy of her thyroid.  She follows with endocrinology but has not seen for quite some time now.  Currently taking levothyroxine.     History of hypercholesterolemia not on medications.  Needs levels rechecked.     History of dermatofibrosarcoma protuberans.  This was surgically corrected at [facility].  She follows with dermatology locally.     Overdue for her mammogram.  She is status post hysterectomy.  She has never had a colonoscopy (refusing) but has had negative fit test.    Review of Systems  Ten point review of systems otherwise negative with the exception of HPI and assessment and plan.    MEDICAL HISTORY:   Past Medical History[1]    Past Surgical History[2]    Medications - Current[3]    Allergies:Allergies[4]    Social History     Socioeconomic History    Marital status:      Spouse name: Not on file    Number of children: Not on file    Years of education: Not on file    Highest education level: Not on file   Occupational History    Not on file   Tobacco Use    Smoking status: Never     Passive exposure: Never    Smokeless tobacco: Never   Vaping Use    Vaping status: Never Used   Substance and Sexual Activity    Alcohol use: Not Currently     Alcohol/week: 2.0 standard drinks of alcohol     Comment: 2 drinks weekly    Drug use: No    Sexual activity: Yes     Partners: Male     Birth control/protection: Hysterectomy   Other Topics Concern    Grew up on a farm Not Asked    History of tanning Not Asked    Outdoor occupation Not Asked    Breast feeding No    Reaction to local anesthetic No    Caffeine Concern Not Asked    Exercise Not Asked    Seat Belt Not Asked    Special Diet Yes     Comment:  Hashimotos diet     Stress Concern Not Asked    Weight Concern Not Asked    Pt has a pacemaker No    Pt has a defibrillator No   Social History Narrative    Work - , painting     Social Drivers of Health     Food Insecurity: Not on file   Transportation Needs: Not on file   Stress: Not on file   Housing Stability: Low Risk  (7/6/2021)    Received from [facility]    Housing Stability     Mortgage Payment Concerns?: Not on file     Number of Places Lived in the Last Year: Not on file     Unstable Housing?: Not on file           PHYSICAL EXAM:     Vitals:    04/11/25 1341   BP: 138/84   Pulse: 82   Resp: 20   Temp: 97.9 °F (36.6 °C)   TempSrc: Temporal   SpO2: 97%   Weight: 241 lb 12.8 oz (109.7 kg)   Height: 5' 5\" (1.651 m)       Body mass index is 40.24 kg/m².    Physical Exam  Constitutional:       Appearance: Normal appearance.   HENT:      Head: Normocephalic and atraumatic.      Right Ear: External ear normal.      Left Ear: External ear normal.      Nose: Nose normal.      Mouth/Throat:      Mouth: Mucous membranes are moist.      Pharynx: Oropharynx is clear.   Eyes:      General: No scleral icterus.        Right eye: No discharge.         Left eye: No discharge.      Extraocular Movements: Extraocular movements intact.      Conjunctiva/sclera: Conjunctivae normal.      Pupils: Pupils are equal, round, and reactive to light.   Cardiovascular:      Rate and Rhythm: Normal rate and regular rhythm.      Pulses: Normal pulses.      Heart sounds: Normal heart sounds. No murmur heard.     No friction rub. No gallop.   Pulmonary:      Effort: Pulmonary effort is normal. No respiratory distress.      Breath sounds: No stridor. No wheezing or rales.   Abdominal:      General: Abdomen is flat. Bowel sounds are normal. There is no distension.      Palpations: There is no mass.      Tenderness: There is no abdominal tenderness. There is no guarding.   Genitourinary:     Comments: Exam  deferred to patient's gynecologist.  Musculoskeletal:         General: No swelling. Normal range of motion.      Cervical back: Normal range of motion.      Right lower leg: No edema.      Left lower leg: No edema.   Skin:     General: Skin is warm.   Neurological:      General: No focal deficit present.      Mental Status: She is alert.   Psychiatric:         Mood and Affect: Mood normal.         Behavior: Behavior normal.           ASSESSMENT/PLAN:   1. Annual physical exam  CBC With Differential With Platelet; Future  Comp Metabolic Panel (14); Future  Lipid Panel; Future  TSH W Reflex To Free T4; Future  [facility] TIFFANIE 2D+3D SCREENING BILAT (CPT=77067/17665); Future    2. Visit for screening mammogram  [facility] TIFFANIE 2D+3D SCREENING BILAT (CPT=77067/71845); Future    3. Hyperlipidemia, unspecified hyperlipidemia type  Comp Metabolic Panel (14); Future  Lipid Panel; Future    4. Hashimoto's thyroiditis  TSH W Reflex To Free T4; Future    5. Dermatofibrosarcoma protuberans  Derm Referral - [facility] ([facility])    6. Colon cancer screening  Occult Blood, Fecal, FIT Immunoassay; Future    Return in about 1 year (around 4/11/2026) for Complete physical.    This note was prepared using Dragon Medical voice recognition dictation software. As a result errors may occur. When identified these errors have been corrected. While every attempt is made to correct errors during dictation discrepancies may still exist.    Stephen Harrison MD  4/11/2025       [1]   Past Medical History:   Allergic rhinitis    allergic to dust mites so take Allegra and Flonase as needed    Dermatofibrosarcoma protuberans    wide excision at [facility]     Disorder of thyroid    goiter, nodule, on thyroid meds, diagnosed with Hashimotos    Dysmenorrhea    I have cramping when I have periods    Environmental allergies    dust mites    Fibroids    Hashimoto's thyroiditis    Hx of motion sickness    Hypothyroidism    goiter    IBS (irritable bowel syndrome)    very  occasional problems    Ovarian cyst    per patient     PONV (postoperative nausea and vomiting)    Problems with swallowing    its difficult to take pills sometimes due to enlarged thyroi    Scalp lesion    right parietal scalp, biopsy ([id number];  4/5/2021): 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)    Thyroid cyst    Urinary incontinence    some, normal for my age   [2]   Past Surgical History:  Procedure Laterality Date    Hysterectomy  2022    Skin excision Right 07/01/2021    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 wide excision with 2cm margins at [facility] see problem list for intial biopsy    Skin surgery  2021    carcinoma removed & scalp reconstruction    Tonsillectomy  1982    Total abdominal hysterectomy N/A 06/06/2022    Procedure: TOTAL ABDOMINAL HYSTERECTOMY- BILATERAL SALPINGO-OOPHORECTOMY;  Surgeon: Arely Choi MD;  Location: [facility] OR   [3]   Current Outpatient Medications:     levothyroxine 125 MCG Oral Tab, Take 1 tablet (125 mcg total) by mouth daily., Disp: 90 tablet, Rfl: 0    cholecalciferol 400 units/mL Oral Liquid, Take 1 mL (400 Units total) by mouth daily., Disp: , Rfl:     ASHWAGANDHA OR, Take 350 mg by mouth daily., Disp: , Rfl:     Multiple Vitamins-Iron (DAILY MULTIPLE VITAMIN/IRON OR), Take by mouth., Disp: , Rfl:     B Complex Vitamins (B COMPLEX OR), Take by mouth., Disp: , Rfl:   [4]   Allergies  Allergen Reactions    Gluten Meal UNKNOWN    Lactase UNKNOWN    Dust Mites Runny nose and ITCHING    Fentanyl NAUSEA ONLY

## 2025-04-11 NOTE — PROGRESS NOTES
The following individual(s) verbally consented to be recorded using ambient AI listening technology and understand that they can each withdraw their consent to this listening technology at any point by asking the clinician to turn off or pause the recording:     Patient name: Dana Farmer  Additional names: n/a

## 2025-04-11 NOTE — PATIENT INSTRUCTIONS
Prevention Guidelines, Women Ages 50 to 64  Screening tests and vaccines are an important part of managing your health. A screening test is done to find possible disorders or diseases in people who don't have any symptoms. The goal is to find a disease early so lifestyle changes can be made and you can be watched more closely to reduce the risk of disease, or to detect it early enough to treat it most effectively. Screening tests are not considered diagnostic, but are used to determine if more testing is needed. Health counseling is essential, too. Below are guidelines for these, for women ages 50 to 64. Talk with your healthcare provider to make sure you’re up to date on what you need.  Screening Who needs it How often   Type 2 diabetes or prediabetes All women beginning at age 45 and women without symptoms at any age who are overweight or obese and have 1 or more additional risk factors for diabetes. At  least every 3 years   Type 2 diabetes or prediabetes All women diagnosed with gestational diabetes Lifelong testing every 3 years   Type 2 diabetes All women with prediabetes Every year   Alcohol misuse All women in this age group At routine exams   Blood pressure All women in this age group Yearly checkup if your blood pressure is normal  Normal blood pressure is less than 120/80 mm Hg  If your blood pressure reading is higher than normal, follow the advice of your healthcare provider   Breast cancer All women at average risk in this age group Yearly mammogram should be done until age 54. At age 55, you can switch to every other year or choose to continue yearly.  All women should know the possible benefits and risks of breast cancer screening with mammograms.   Cervical cancer All women in this age group, except women who have had a complete hysterectomy Pap test every 3 years or Pap test with human papillomavirus (HPV) test every 5 years   Chlamydia Women at increased risk for infection At routine exams    Colorectal cancer All women at average risk in this age group Multiple tests are available and are used at different times. Possible tests include:  Flexible sigmoidoscopy every 5 years, or  Colonoscopy every 10 years, or  CT colonography (virtual colonoscopy) every 5 years, or  Yearly fecal occult blood test, or  Yearly fecal immunochemical test every year, or  Stool DNA test, every 3 years  If you choose a test other than a colonoscopy and have an abnormal test result, you will need to follow up with a colonoscopy. Screening advice varies among expert groups. Talk with your healthcare provider about which tests are best for you.  Some people should be screened using a different schedule because of their personal or family health history. Talk with your healthcare provider about your health history.   Depression All women in this age group At routine exams   Gonorrhea Sexually active women at increased risk for infection At routine exams   Hepatitis C Anyone at increased risk; 1 time for those born between 1945 and 1965 At routine exams   High cholesterol or triglycerides All women in this age group who are at risk for coronary artery disease At least every 5 years   HIV All women At routine exams   Lung cancer Adults age 55 to 80 who have smoked Yearly screening in smokers with 30 pack-year history of smoking or who quit within 15 years   Obesity All women in this age group At routine exams   Osteoporosis Women who are postmenopausal Ask your healthcare provider   Syphilis Women at increased risk for infection - talk with your healthcare provider At routine exams   Tuberculosis Women at increased risk for infection - talk with your healthcare provider Ask your healthcare provider   Vision All women in this age group Ask your healthcare provider   Vaccine Who needs it How often   Chickenpox (varicella) All women in this age group who have no record of this infection or vaccine 2 doses; the second dose should be  given at least 4 weeks after the first dose   Hepatitis A Women at increased risk for infection - talk with your healthcare provider 2 doses given at least 6 months apart   Hepatitis B Women at increased risk for infection - talk with your healthcare provider 3 doses over 6 months; second dose should be given 1 month after the first dose; the third dose should be given at least 2 months after the second dose and at least 4 months after the first dose   Haemophilus influenzae Type B (HIB) Women at increased risk for infection - talk with your healthcare provider 1 to 3 doses   Influenza (flu) All women in this age group Once a year   Measles, mumps, rubella (MMR) Women in this age group through their late 50s who have no record of these infections or vaccines 1 dose   Meningococcal Women at increased risk for infection - talk with your healthcare provider 1 or more doses   Pneumococcal conjugate vaccine (PCV13) and pneumococcal polysaccharide vaccine (PPSV23) Women at increased risk for infection - talk with your healthcare provider PCV13: 1 dose ages 19 to 65 (protects against 13 types of pneumococcal bacteria)  PPSV23: 1 to 2 doses through age 64, or 1 dose at 65 or older (protects against 23 types of pneumococcal bacteria)   Tetanus/diphtheria/pertussis (Td/Tdap) booster All women in this age group Td every 10 years, or a 1-time dose of Tdap instead of a Td booster after age 18, then Td every 10 years   Zoster All women ages 60 and older 1 dose   Counseling Who needs it How often   BRCA gene mutation testing for breast and ovarian cancer susceptibility Women with increased risk for having gene mutation When your risk is known   Breast cancer and chemoprevention Women at high risk for breast cancer When your risk is known   Diet and exercise Women who are overweight or obese When diagnosed, and then at routine exams   Sexually transmitted infection prevention Women at increased risk for infection - talk with your  healthcare provider At routine exams   Use of daily aspirin Women ages 55 and up in this age group who are at risk for cardiovascular health problems such as stroke When your risk is known   Use of tobacco and the health effects it can cause All women in this age group Every exam   1 American Cancer Society  Date Last Reviewed: 1/26/2016  © 3556-9283 [employer]. [address] [place] [address], [place] [address]. All rights reserved. This information is not intended as a substitute for professional medical care. Always follow your healthcare professional's instructions.

## 2025-04-21 ENCOUNTER — HOSPITAL ENCOUNTER (OUTPATIENT)
Dept: MAMMOGRAPHY | Age: 60
Discharge: HOME | End: 2025-04-21
Attending: INTERNAL MEDICINE
Payer: COMMERCIAL

## 2025-04-21 ENCOUNTER — LAB ENCOUNTER (OUTPATIENT)
Dept: LAB | Age: 60
End: 2025-04-21
Attending: INTERNAL MEDICINE
Payer: COMMERCIAL

## 2025-04-21 DIAGNOSIS — Z12.31 VISIT FOR SCREENING MAMMOGRAM: ICD-10-CM

## 2025-04-21 DIAGNOSIS — Z12.11 COLON CANCER SCREENING: ICD-10-CM

## 2025-04-21 DIAGNOSIS — Z00.00 ANNUAL PHYSICAL EXAM: ICD-10-CM

## 2025-04-21 LAB — HEMOCCULT STL QL: NEGATIVE

## 2025-04-21 PROCEDURE — 77063 BREAST TOMOSYNTHESIS BI: CPT | Performed by: INTERNAL MEDICINE

## 2025-04-21 PROCEDURE — 82274 ASSAY TEST FOR BLOOD FECAL: CPT

## 2025-04-21 PROCEDURE — 77067 SCR MAMMO BI INCL CAD: CPT | Performed by: INTERNAL MEDICINE

## 2025-04-29 NOTE — TELEPHONE ENCOUNTER
ergocalciferol 1.25 MG (24428 UT) Oral Cap, Take 1 capsule (50,000 Units total) by mouth once a week for 42 days, THEN 1 capsule (50,000 Units total) every 30 (thirty) days. , Disp: 8 capsule, Rfl: 0      PT NEEDS A 90 DAY SUPPLY Graft Donor Site Bandage (Optional-Leave Blank If You Don't Want In Note): Steri-strips and a pressure bandage were applied to the donor site.

## 2025-05-14 ENCOUNTER — OFFICE VISIT (OUTPATIENT)
Dept: INTERNAL MEDICINE CLINIC | Facility: CLINIC | Age: 60
End: 2025-05-14
Payer: COMMERCIAL

## 2025-05-14 VITALS
RESPIRATION RATE: 18 BRPM | HEIGHT: 65 IN | DIASTOLIC BLOOD PRESSURE: 80 MMHG | HEART RATE: 61 BPM | WEIGHT: 240.63 LBS | SYSTOLIC BLOOD PRESSURE: 126 MMHG | BODY MASS INDEX: 40.09 KG/M2 | TEMPERATURE: 98 F | OXYGEN SATURATION: 97 %

## 2025-05-14 DIAGNOSIS — R21 RASH: Primary | ICD-10-CM

## 2025-05-14 PROCEDURE — 99213 OFFICE O/P EST LOW 20 MIN: CPT | Performed by: INTERNAL MEDICINE

## 2025-05-14 NOTE — PROGRESS NOTES
Patient ID: Dana Farmer is a 60 year old female.  Chief Complaint   Patient presents with    Rash     Rash on left ear        HISTORY OF PRESENT ILLNESS:   HPI  Patient presents for above.  Over the past week he has noted a rash behind her left ear that has now progressed to under her left side of lip.  She does have a history of shingles.  There has been pruritus associated with this.  No other changes in health.  Has not been exposed to anyone with shingles.  She is up-to-date on her Shingrix vaccination.    Review of Systems  Ten point review of systems otherwise negative with the exception of HPI and assessment and plan.    MEDICAL HISTORY:     Past Medical History[1]    Past Surgical History[2]    Medications - Current[3]    Allergies:Allergies[4]    Social History     Socioeconomic History    Marital status:      Spouse name: Not on file    Number of children: Not on file    Years of education: Not on file    Highest education level: Not on file   Occupational History    Not on file   Tobacco Use    Smoking status: Never     Passive exposure: Never    Smokeless tobacco: Never   Vaping Use    Vaping status: Never Used   Substance and Sexual Activity    Alcohol use: Not Currently     Alcohol/week: 2.0 standard drinks of alcohol     Comment: 2 drinks weekly    Drug use: No    Sexual activity: Yes     Partners: Male     Birth control/protection: Hysterectomy   Other Topics Concern    Grew up on a farm Not Asked    History of tanning Not Asked    Outdoor occupation Not Asked    Breast feeding No    Reaction to local anesthetic No    Caffeine Concern Not Asked    Exercise Not Asked    Seat Belt Not Asked    Special Diet Yes     Comment: Hashimotos diet     Stress Concern Not Asked    Weight Concern Not Asked    Pt has a pacemaker No    Pt has a defibrillator No   Social History Narrative    Work - , painting     Social Drivers of Health     Food Insecurity: Not on file   Transportation  Needs: Not on file   Stress: Not on file   Housing Stability: Low Risk  (7/6/2021)    Received from [facility]    Housing Stability     Mortgage Payment Concerns?: Not on file     Number of Places Lived in the Last Year: Not on file     Unstable Housing?: Not on file           PHYSICAL EXAM:     Vitals:    05/14/25 1030   BP: 126/80   Pulse: 61   Resp: 18   Temp: 97.5 °F (36.4 °C)   TempSrc: Temporal   SpO2: 97%   Weight: 240 lb 9.6 oz (109.1 kg)   Height: 5' 5\" (1.651 m)       Body mass index is 40.04 kg/m².    Physical Exam  Constitutional:       Appearance: Normal appearance.   Eyes:      General: No scleral icterus.  Skin:     Comments: Rash as pictured below.   Neurological:      Mental Status: She is alert.   Psychiatric:         Mood and Affect: Mood normal.         Behavior: Behavior normal.                   ASSESSMENT/PLAN:   1. Rash  Did not suspect this was shingles.  She is outside the window for treatment of shingles anyways.  Told to use topical hydrocortisone cream for her ear area.  If rash progresses then told to sign updated pictures we will treat accordingly.    Return if symptoms worsen or fail to improve.    This note was prepared using Dragon Medical voice recognition dictation software. As a result errors may occur. When identified these errors have been corrected. While every attempt is made to correct errors during dictation discrepancies may still exist.    Stephen Harrison MD  5/14/2025       [1]   Past Medical History:   Allergic rhinitis    allergic to dust mites so take Allegra and Flonase as needed    Cancer (HCC)    Dermatofibrosarcoma protuberans    wide excision at [facility]     Disorder of thyroid    goiter, nodule, on thyroid meds, diagnosed with Hashimotos    Dysmenorrhea    I have cramping when I have periods    Environmental allergies    dust mites    Fibroids    Hashimoto's thyroiditis    Hx of motion sickness    Hypothyroidism    goiter    IBS (irritable bowel  syndrome)    very occasional problems    Ovarian cyst    per patient     PONV (postoperative nausea and vomiting)    Problems with swallowing    its difficult to take pills sometimes due to enlarged thyroi    Scalp lesion    right parietal scalp, biopsy ([id number];  4/5/2021): 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)    Thyroid cyst    Urinary incontinence    some, normal for my age   [2]   Past Surgical History:  Procedure Laterality Date    Hysterectomy  2022    Skin excision Right 07/01/2021    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 wide excision with 2cm margins at [facility] see problem list for intial biopsy    Skin surgery  2021    carcinoma removed & scalp reconstruction    Tonsillectomy  1982    Total abdominal hysterectomy N/A 06/06/2022    Procedure: TOTAL ABDOMINAL HYSTERECTOMY- BILATERAL SALPINGO-OOPHORECTOMY;  Surgeon: Arely Choi MD;  Location: [facility] MAIN OR   [3]   Current Outpatient Medications:     levothyroxine 125 MCG Oral Tab, Take 1 tablet (125 mcg total) by mouth daily., Disp: 90 tablet, Rfl: 0    cholecalciferol 400 units/mL Oral Liquid, Take 1 mL (400 Units total) by mouth daily., Disp: , Rfl:     ASHWAGANDHA OR, Take 350 mg by mouth daily., Disp: , Rfl:     Multiple Vitamins-Iron (DAILY MULTIPLE VITAMIN/IRON OR), Take by mouth., Disp: , Rfl:     B Complex Vitamins (B COMPLEX OR), Take by mouth., Disp: , Rfl:   [4]   Allergies  Allergen Reactions    Gluten Meal UNKNOWN    Lactase UNKNOWN    Dust Mites Runny nose and ITCHING    Fentanyl NAUSEA ONLY

## 2025-07-21 RX ORDER — LEVOTHYROXINE SODIUM 125 UG/1
125 TABLET ORAL DAILY
Qty: 90 TABLET | Refills: 0 | Status: SHIPPED | OUTPATIENT
Start: 2025-07-21

## 2025-07-23 RX ORDER — LEVOTHYROXINE SODIUM 125 UG/1
125 TABLET ORAL DAILY
Qty: 90 TABLET | Refills: 0 | OUTPATIENT
Start: 2025-07-23

## 2025-07-23 NOTE — TELEPHONE ENCOUNTER
3 month supply of refills was sent to [facility] on 07/21/2025.    Outpatient Medication Detail     Disp Refills Start End    LEVOTHYROXINE 125 MCG Oral Tab 90 tablet 0 7/21/2025 --    Sig - Route: TAKE 1 TABLET BY MOUTH EVERY DAY - Oral    Sent to pharmacy as: Levothyroxine Sodium 125 MCG Oral Tablet (Synthroid)    E-Prescribing Status: Receipt confirmed by pharmacy (7/21/2025  1:34 PM CDT)      Pharmacy    [id number] - [place], [place] - [address] AT [facility], [phone], [phone]

## (undated) DIAGNOSIS — D21.9 FIBROID: Primary | ICD-10-CM

## (undated) DIAGNOSIS — Z13.6 SCREENING FOR CARDIOVASCULAR CONDITION: Primary | ICD-10-CM

## (undated) DEVICE — 3M™ STERI-STRIP™ COMPOUND BENZOIN TINCTURE 40 BAGS/CARTON 4 CARTONS/CASE C1544: Brand: 3M™ STERI-STRIP™

## (undated) DEVICE — 3M™ STERI-STRIP™ REINFORCED ADHESIVE SKIN CLOSURES, R1547, 1/2 IN X 4 IN (12 MM X 100 MM), 6 STRIPS/ENVELOPE: Brand: 3M™ STERI-STRIP™

## (undated) DEVICE — GAMMEX® PI HYBRID SIZE 6, STERILE POWDER-FREE SURGICAL GLOVE, POLYISOPRENE AND NEOPRENE BLEND: Brand: GAMMEX

## (undated) DEVICE — WATER STERILE AQUALITE 1000ML

## (undated) DEVICE — SUT VICRYL 0 J906G

## (undated) DEVICE — APPLICATOR CHLORAPREP 26ML

## (undated) DEVICE — NDLCTR: FOAM/ADHESIVE 10CT 96/CS: Brand: MEDICAL ACTION INDUSTRIES

## (undated) DEVICE — SPONGE PREMIERPRO 7X18X18

## (undated) DEVICE — GAMMEX® PI HYBRID SIZE 6.5, STERILE POWDER-FREE SURGICAL GLOVE, POLYISOPRENE AND NEOPRENE BLEND: Brand: GAMMEX

## (undated) DEVICE — CONTAINER GENT-L-KARE 4OZ GRAY

## (undated) DEVICE — LAPAROTOMY: Brand: MEDLINE INDUSTRIES, INC.

## (undated) DEVICE — SUT VICRYL 0 CT-1 J340H

## (undated) DEVICE — SUT VICRYL 2-0 SH J417H

## (undated) DEVICE — SUT VICRYL 0 CT-1 JJ31G

## (undated) DEVICE — 3M™ MEDITPORE™ SOFT CLOTH TAPE 6 IN X 10 YD 12 ROLLS/CASE 2966: Brand: 3M™ MEDIPORE™

## (undated) DEVICE — PENCIL TELESCOPE MEGADYNE SE

## (undated) DEVICE — SUT PLAIN GUT 3-0 CT-1 842H

## (undated) DEVICE — TOWEL SURG OR 17X30IN BLUE

## (undated) DEVICE — UNDYED BRAIDED (POLYGLACTIN 910), SYNTHETIC ABSORBABLE SUTURE: Brand: COATED VICRYL

## (undated) NOTE — LETTER
MAJOR CASE PREOPERATIVE INSTRUCTIONS    3/29/2022      Dear Xander Ferrell are having a Total Abdominal Hysterectomy/ Bilateral Salping-Oophorectomy on Monday,2022 at 7:30am at [facility].    Do not eat or drink anything (including water) after midnight the night before surgery. Only take in clear liquids on , the day before surgery. Some examples of clear liquids are water, coffee/tea without milk/cream, clear broth, apple juice, or any juice that you can see through, jello, popsicles, lemon ice, or clear soda like ginger ale or 7-up. Please review and follow the attached Preoperative Antimicrobial Wash/Bath Instructions. You are to call this office if you have any cold or flu symptoms 2 days before your scheduled surgery. Please avoid ALL aspirin and herbal supplements 7 days before surgery. Avoid Ibuprofen, Motrin, Aleve, or Naprosyn for 3 days before surgery. Please avoid ALL Cannabis use 24 hours prior to surgery. You cannot wear hair pins,wigs,artificial nail or metallic nails/ nail polish for surgery. You will be contacted by PreAdmission Testing (PAT) usually within the week before surgery. They will take a short medical history and let you know if any preoperative testing is needed. If you have any questions for preadmission testing feel free to call them directly at [phone]. In accordance with IDPH guidelines we must ask that you self-quarantine as best as possible until the day of your surgical/non-surgical procedure once you have been tested for   COVID( testing is performed with 72 hours of the scheduled procedure). It is important to take precautions against the spread of COVID-19 disease both before   and after your surgical procedure. We ask that you adhere to the followin. Avoid crowds  2. Wear a mask or face covering in public  3. Maintain social distancing  4.  Practice good hygiene    I initiated prior authorization with your insurance. For your records I was provided with pending reference authorization number [id number]. Call our office now to schedule your post-operative appointment for 4 weeks after surgery. Please feel free to contact our office at [phone] if you have any questions regarding these instructions or your procedure.       Sincerely,          Gonsalo Oliver MD  [address] [place] [id number] [phone]    Document electronically generated by:  Rose Abraham

## (undated) NOTE — LETTER
11/15/21        [address]  [facility]      Dear Lorraine Lizarraga,    [address] [facility] records indicate that you have outstanding lab work and or testing that was ordered for you and has not yet been completed:  Orders Placed This Encounter

## (undated) NOTE — LETTER
7/25/2018              [address] [place] [address]         Dear Janett Monsalve,      It was a pleasure to see you at our [facility] [place], [place] office.   Your pap & HPV are negative, jose

## (undated) NOTE — LETTER
04/15/19        [facility]      Dear Denisse Galan,    [address] [facility] records indicate that you have outstanding lab work and or testing that was ordered for you and has not yet been completed:  Orders Placed This Encounter

## (undated) NOTE — LETTER
06/28/18        Dana Farmer  [address] [facility]      Dear Sue Llanos,    [address] [facility] records indicate that you have outstanding lab work and or testing that was ordered for you and has not yet been completed:    TSH [E]    To provide you with

## (undated) NOTE — ED AVS SNAPSHOT
[facility] in [address] [address] [address]    Phone:  [phone]    Fax:  [phone]           Valeria Emery   MRN: [id number]    Department:  [facility] in [address] [place] [address]   Date of Visit:  4/5 As discussed, we are treating you for presumed urinary tract infection. Urine culture should be available in 2-3 days to confirm this. However it is possible that something else is going on. Close follow-up and monitoring of symptoms is very important. [facility]. Follow-up care is at the discretion of that Physician.   If you need additional assistance selecting a physician, you may call the [place] [address] Physician Referral and Class Registration line at [phone] or f [facility] [address] ([facility]) [address] [facility] [address] [address]  [place] [address]) [phone]                Additional Information       We are concerned for your overall well being:    - If you are a smoker or

## (undated) NOTE — LETTER
11/28/20        [address]  [facility]      Dear Edilbreto Andrade,    [address] [facility] records indicate that you have outstanding lab work and or testing that was ordered for you and has not yet been completed:  Orders Placed This Encounter

## (undated) NOTE — Clinical Note
Thank you for the consult. I saw Ms Althea Closs in the endocrine/diabetes clinic today. Please see attached my note. Please feel free to contact me with any questions. Thanks!

## (undated) NOTE — LETTER
[facility]  [address] [address] [address]  [phone]  Authorization for Imaging Procedure    1. I hereby authorize  ______________, my physician and his/her assistants (if applicable), which may include medical students, residents, and/or fellows, to perform the following procedure and administer such anesthesia as may be determined necessary by my physician: ULTRASOUND GUIDED FINE NEEDLE ASPIRATIONS BIOPSY OF LEFT THYROID NODULE on [address]. 2.  I recognize that during the procedure, unforeseen conditions may necessitate additional or different procedures than those listed above. I, therefore, further authorize and request that the above-named physician, assistants, or designees perform such procedures as are, in their judgment, necessary and desirable. 3.  My physician has discussed prior to my procedure the potential benefits, risks and side effects of this procedure; the likelihood of achieving goals; and potential problems that might occur during recuperation. They also discussed reasonable alternatives to the procedure, including risks, benefits, and side effects related to the alternatives and risks related to not receiving this procedure. I have had all my questions answered and I acknowledge that no guarantee has been made as to the result that may be obtained. 4.  Should the need arise during my procedure, which includes change of level of care prior to discharge, I also consent to the administration of blood and/or blood products. Further, I understand that despite careful testing and screening of blood or blood products by collecting agencies, I may still be subject to ill effects as a result of receiving a blood transfusion and/or blood products.  The following are some, but not all, of the potential risks that can occur: fever and allergic reactions, hemolytic reactions, transmission of diseases such as Hepatitis, AIDS and Cytomegalovirus (CMV) and fluid overload. In the event that I wish to have an autologous transfusion of my own blood, or a directed donor transfusion, I will discuss this with my physician. Check only if Refusing Blood or Blood Products  I understand refusal of blood or blood products as deemed necessary by my physician may have serious consequences to my condition to include possible death. I hereby assume responsibility for my refusal and release the hospital, its personnel, and my physicians from any responsibility for the consequences of my refusal.   [  ] Patient Refuses Blood      5. I authorize the use of any specimen, organs, tissues, body parts or foreign objects that may be removed from my body during the procedure for diagnosis, research or teaching purposes and their subsequent disposal by hospital authorities. I also authorize the release of specimen test results and/or written reports to my treating physician on the hospital medical staff or other referring or consulting physicians involved in my care, at the discretion of the Pathologist or my treating physician. 6.  I consent to the photographing or videotaping of the procedures to be performed, including appropriate portions of my body for medical, scientific, or educational purposes, provided my identity is not revealed by the pictures or by descriptive texts accompanying them. If the procedure has been photographed/videotaped, the physician will obtain the original picture, image, videotape or CD. The hospital will not be responsible for storage, release or maintenance of the picture, image, tape or CD.   7.  I consent to the presence of a  or observers in the operating room as deemed necessary by my physician or their designees. 8.  I recognize that in the event my procedure results in extended X-Ray/fluoroscopy time, I may develop a skin reaction. 9.   If I have a Do Not Attempt Resuscitation (DNAR) order in place, that status will be suspended while in the operating room, procedural suite, and during the recovery period unless otherwise explicitly stated by me (or a person authorized to consent on my behalf). The performing physician or my attending physician will determine when the applicable recovery period ends for purposes of reinstating the DNAR order. 10.  I acknowledge that my physician has explained sedation/analgesia administration to me including the risk and benefits I consent to the administration of sedation/analgesia as may be necessary or desirable in the judgment of my physician. I CERTIFY THAT I HAVE READ AND FULLY UNDERSTAND THE ABOVE CONSENT FOR THE PROCEDURE.      Signature of Patient: _____________________________________________________________  Responsible person in case of minor, unconscious: ____________________________________  Relationship to patient:  __________________________________________________________    Signature of Witness: _______________________________Date: _________Time: __________    Signature of PROVIDER: _______________________________Date: _________Time: __________    Patient Name: Mohit Olmedo : 1965  Printed: 2022   Medical Record #: [id number]